# Patient Record
Sex: FEMALE | Race: WHITE | NOT HISPANIC OR LATINO | Employment: UNEMPLOYED | URBAN - METROPOLITAN AREA
[De-identification: names, ages, dates, MRNs, and addresses within clinical notes are randomized per-mention and may not be internally consistent; named-entity substitution may affect disease eponyms.]

---

## 2017-11-04 LAB — HCV AB SER-ACNC: NEGATIVE

## 2021-04-08 DIAGNOSIS — Z23 ENCOUNTER FOR IMMUNIZATION: ICD-10-CM

## 2022-06-09 LAB — HBA1C MFR BLD HPLC: 6.4 %

## 2023-06-26 ENCOUNTER — TELEPHONE (OUTPATIENT)
Dept: ADMINISTRATIVE | Facility: OTHER | Age: 68
End: 2023-06-26

## 2023-06-26 RX ORDER — LEVOTHYROXINE SODIUM 0.05 MG/1
TABLET ORAL
COMMUNITY
Start: 2023-05-03

## 2023-06-26 RX ORDER — SPIRONOLACTONE 25 MG/1
TABLET ORAL
COMMUNITY
Start: 2023-05-03

## 2023-06-26 RX ORDER — VALSARTAN 80 MG/1
TABLET ORAL
COMMUNITY
Start: 2023-05-03

## 2023-06-26 NOTE — TELEPHONE ENCOUNTER
----- Message from Cee Mcdaniel sent at 6/26/2023 11:19 AM EDT -----  Regarding: mammo  06/26/23 11:20 AM    Hello, our patient Rolly Britt has had Mammogram completed/performed  Please assist in updating the patient chart by pulling the document from chart review  Tab within Chart Review  The date of service is 11/2022       Thank you,  Cee Mcdaniel   Sawyer Malgorzata

## 2023-06-26 NOTE — TELEPHONE ENCOUNTER
Upon review of the In Basket request we were able to locate, review, and update the patient chart as requested for Mammogram     Any additional questions or concerns should be emailed to the Practice Liaisons via the appropriate education email address, please do not reply via In Basket      Thank you  Owen Aragon

## 2023-06-27 ENCOUNTER — OFFICE VISIT (OUTPATIENT)
Dept: FAMILY MEDICINE CLINIC | Facility: CLINIC | Age: 68
End: 2023-06-27
Payer: MEDICARE

## 2023-06-27 ENCOUNTER — APPOINTMENT (OUTPATIENT)
Age: 68
End: 2023-06-27
Payer: MEDICARE

## 2023-06-27 ENCOUNTER — TELEPHONE (OUTPATIENT)
Dept: ADMINISTRATIVE | Facility: OTHER | Age: 68
End: 2023-06-27

## 2023-06-27 VITALS
BODY MASS INDEX: 30.79 KG/M2 | TEMPERATURE: 97.6 F | HEIGHT: 63 IN | WEIGHT: 173.8 LBS | SYSTOLIC BLOOD PRESSURE: 122 MMHG | DIASTOLIC BLOOD PRESSURE: 72 MMHG

## 2023-06-27 DIAGNOSIS — R50.9 FEVER, UNSPECIFIED FEVER CAUSE: ICD-10-CM

## 2023-06-27 DIAGNOSIS — L30.9 DERMATITIS: Primary | ICD-10-CM

## 2023-06-27 DIAGNOSIS — L30.9 DERMATITIS: ICD-10-CM

## 2023-06-27 LAB — B BURGDOR IGG+IGM SER-ACNC: 1 AI

## 2023-06-27 PROCEDURE — 99213 OFFICE O/P EST LOW 20 MIN: CPT | Performed by: NURSE PRACTITIONER

## 2023-06-27 PROCEDURE — 86617 LYME DISEASE ANTIBODY: CPT

## 2023-06-27 PROCEDURE — 86618 LYME DISEASE ANTIBODY: CPT

## 2023-06-27 PROCEDURE — 36415 COLL VENOUS BLD VENIPUNCTURE: CPT

## 2023-06-27 RX ORDER — CHLORAL HYDRATE 500 MG
1000 CAPSULE ORAL DAILY
COMMUNITY

## 2023-06-27 RX ORDER — CEFUROXIME AXETIL 500 MG/1
500 TABLET ORAL EVERY 12 HOURS SCHEDULED
Qty: 20 TABLET | Refills: 0 | Status: SHIPPED | OUTPATIENT
Start: 2023-06-27 | End: 2023-07-07

## 2023-06-27 NOTE — TELEPHONE ENCOUNTER
----- Message from Natalie Negrete MA sent at 6/26/2023  2:28 PM EDT -----  Regarding: hep c  06/26/23 2:28 PM    Hello, our patient Vinay Eddie has had Hepatitis C completed/performed  Please assist in updating the patient chart by pulling the Care Everywhere (CE) document  The date of service is 11/4/17       Thank you,  Jacqualyn Nurse, MA [facility]

## 2023-06-27 NOTE — TELEPHONE ENCOUNTER
Upon review of the In Basket request we were able to locate, review, and update the patient chart as requested for CRC: Cologkrunal  Any additional questions or concerns should be emailed to the Practice Liaisons via the appropriate education email address, please do not reply via In Basket      Thank you  Candi Lopez

## 2023-06-27 NOTE — TELEPHONE ENCOUNTER
IT is working on correcting the [employer]  The requested item will be linked by the system at that time  Request is being closed  Thank you

## 2023-06-27 NOTE — PROGRESS NOTES
"Name: Maribell Carmona      : 1955      MRN: [id number]  Encounter Provider: DANNY Betancourt  Encounter Date: 2023   Encounter department: [facility]     1  Dermatitis  Comments:  Local reaction versus atypical erythema migrans versus mild cellulitis  Cover with Ceftin and days which should cover Lyme/cellulitis  Orders:  -     cefuroxime (CEFTIN) 500 mg tablet; Take 1 tablet (500 mg total) by mouth every 12 (twelve) hours for 10 days  -     Lyme Total Antibody Profile with reflex to WB; Future    2  Fever, unspecified fever cause  Comments:  Lyme titer though likely too soon to test positive  Follow-up if unresolved anytime if worse  Already takes probiotics  Subjective      Patient started 5 days ago with a fever  As high as 101  Started the day after she had been [place] cherries [place] an or[place]  And remember any insect bite and she did not see any ticks on her  In her right upper neck near her hairline so it may have been hidden  Now feels mildly itchy and it feels like there is pressure around her neck and posterior ear  Denies sinus congestion sore throat or cough  Mild fatigue, very mild aches nothing more than usual   Fever has improved  Review of Systems    Current Outpatient Medications on File Prior to Visit   Medication Sig   • levothyroxine 50 mcg tablet    • metFORMIN (GLUCOPHAGE) 500 mg tablet    • Omega-3 Fatty Acids (fish oil) 1,000 mg Take 1,000 mg by mouth daily   • spironolactone (ALDACTONE) 25 mg tablet    • valsartan (DIOVAN) 80 mg tablet        Objective     /72 (BP Location: Left arm, Patient Position: Sitting, Cuff Size: Adult)   Temp 97 6 °F (36 4 °C) (Tympanic)   Ht 5' 3\" (1 6 m)   Wt 78 8 kg (173 lb 12 8 oz)   BMI 30 79 kg/m²     Physical Exam  Vitals and nursing note reviewed  Constitutional:       General: She is not in acute distress  Appearance: Normal appearance   She is not " toxic-appearing  HENT:      Head: Normocephalic  Comments: Passage with perhaps a little bit of swelling on the right sinus but not red or inflamed no discharge throat is clear no cervical lymphadenopathy   The border of right lower hairline there is a round approximately 4 cm diameter red patch with not warm slightly tender slightly itchy  No central clearing  Left Ear: Tympanic membrane normal    Eyes:      Conjunctiva/sclera: Conjunctivae normal    Cardiovascular:      Rate and Rhythm: Normal rate and regular rhythm  Heart sounds: Normal heart sounds  Pulmonary:      Effort: Pulmonary effort is normal       Breath sounds: Normal breath sounds  Skin:     General: Skin is warm and dry  Neurological:      General: No focal deficit present  Mental Status: She is alert and oriented to person, place, and time     Psychiatric:         Mood and Affect: Mood normal        DANNY Betancourt

## 2023-06-27 NOTE — TELEPHONE ENCOUNTER
----- Message from Peggy Aguayo MA sent at 6/26/2023  2:26 PM EDT -----  Regarding: cologuard  06/26/23 2:26 PM    Hello, our patient Jane Shin has had CRC: Cologkrunal completed/performed  Please assist in updating the patient chart by pulling the Care Everywhere (CE) document  The date of service is 7/19/2020       Thank you,  Peggy Aguayo MA  [facility]

## 2023-06-29 ENCOUNTER — OFFICE VISIT (OUTPATIENT)
Dept: FAMILY MEDICINE CLINIC | Facility: CLINIC | Age: 68
End: 2023-06-29
Payer: MEDICARE

## 2023-06-29 VITALS
WEIGHT: 174.8 LBS | OXYGEN SATURATION: 97 % | HEIGHT: 63 IN | SYSTOLIC BLOOD PRESSURE: 122 MMHG | BODY MASS INDEX: 30.97 KG/M2 | HEART RATE: 76 BPM | DIASTOLIC BLOOD PRESSURE: 74 MMHG | TEMPERATURE: 97.5 F

## 2023-06-29 DIAGNOSIS — Z13.820 SCREENING FOR OSTEOPOROSIS: ICD-10-CM

## 2023-06-29 DIAGNOSIS — Z78.0 UNCOMPLICATED MENOPAUSE: ICD-10-CM

## 2023-06-29 DIAGNOSIS — G47.33 OBSTRUCTIVE SLEEP APNEA SYNDROME: Primary | ICD-10-CM

## 2023-06-29 DIAGNOSIS — E03.9 ACQUIRED HYPOTHYROIDISM: ICD-10-CM

## 2023-06-29 DIAGNOSIS — E78.2 MIXED HYPERLIPIDEMIA: ICD-10-CM

## 2023-06-29 DIAGNOSIS — L65.9 ALOPECIA: ICD-10-CM

## 2023-06-29 DIAGNOSIS — E55.9 VITAMIN D DEFICIENCY: ICD-10-CM

## 2023-06-29 DIAGNOSIS — R73.03 PREDIABETES: ICD-10-CM

## 2023-06-29 DIAGNOSIS — I10 ESSENTIAL HYPERTENSION: ICD-10-CM

## 2023-06-29 LAB
B BURGDOR IGG PATRN SER IB-IMP: NEGATIVE
B BURGDOR IGM PATRN SER IB-IMP: NEGATIVE
B BURGDOR18KD IGG SER QL IB: ABNORMAL
B BURGDOR23KD IGG SER QL IB: ABNORMAL
B BURGDOR23KD IGM SER QL IB: PRESENT
B BURGDOR28KD IGG SER QL IB: ABNORMAL
B BURGDOR30KD IGG SER QL IB: ABNORMAL
B BURGDOR39KD IGG SER QL IB: ABNORMAL
B BURGDOR39KD IGM SER QL IB: ABNORMAL
B BURGDOR41KD IGG SER QL IB: ABNORMAL
B BURGDOR41KD IGM SER QL IB: ABNORMAL
B BURGDOR45KD IGG SER QL IB: ABNORMAL
B BURGDOR58KD IGG SER QL IB: ABNORMAL
B BURGDOR66KD IGG SER QL IB: ABNORMAL
B BURGDOR93KD IGG SER QL IB: ABNORMAL

## 2023-06-29 PROCEDURE — 99213 OFFICE O/P EST LOW 20 MIN: CPT | Performed by: INTERNAL MEDICINE

## 2023-06-29 RX ORDER — BACILLUS COAGULANS/INULIN 1B-250 MG
CAPSULE ORAL
COMMUNITY
Start: 2021-03-30

## 2023-06-29 RX ORDER — AMPICILLIN TRIHYDRATE 250 MG
CAPSULE ORAL
COMMUNITY
Start: 2021-03-01

## 2023-06-29 NOTE — ASSESSMENT & PLAN NOTE
Patient is very compliant  She is not particularly happy with wearing a CPAP, but she notes she no longer has difficulty getting out of bed  She is more awake in the morning  She is on AutoPap  We will continue with compliance, has follow-up in 2 months again for a wellness visit

## 2023-06-29 NOTE — PROGRESS NOTES
Name: Dmitriy Emanuel      : 1955      MRN: [id number]  Encounter Provider: Joy Daniel DO  Encounter Date: 2023   Encounter department: [facility]     1  Obstructive sleep apnea syndrome  Assessment & Plan:  Patient is very compliant  She is not particularly happy with wearing a CPAP, but she notes she no longer has difficulty getting out of bed  She is more awake in the morning  She is on AutoPap  We will continue with compliance, has follow-up in 2 months again for a wellness visit  2  Essential hypertension  Assessment & Plan:  Since wearing CPAP and now on spironolactone, her blood pressure is in the lower realm systolically  She will monitor outpatient blood pressures and bring them to her next appointment  Consider de-escalation of anti-hypertensives  Orders:  -     CBC and differential; Future  -     Comprehensive metabolic panel; Future    3  Acquired hypothyroidism  Assessment & Plan:  Continue with periodic monitoring    Orders:  -     T4, free; Future; Expected date: 2023  -     TSH, 3rd generation; Future  -     Lipid Panel with Direct LDL reflex; Future    4  Prediabetes  Assessment & Plan:  Diet and exercise discussed  Continue low carbohydrate intake  Orders:  -     CBC and differential; Future  -     Comprehensive metabolic panel; Future  -     Hemoglobin A1C; Future    5  Alopecia  Assessment & Plan:  Remains on spironolactone      6  Screening for osteoporosis  Assessment & Plan:  Due for bone density    Orders:  -     DXA bone density spine hip and pelvis; Future; Expected date: 2023    7  Mixed hyperlipidemia  Assessment & Plan:  Labs before next appointment      8  Vitamin D deficiency  Assessment & Plan:  Due for bone density and vitamin D screening  Orders:  -     Vitamin D 25 hydroxy; Future    9  Uncomplicated menopause  -     DXA bone density spine hip and pelvis;  Future; Expected date: "07/29/2023           Subjective      Patient feels well  She is here mostly for follow-up of her sleep apnea under use of CPAP  She has been very compliant  Using it more than 68 hours a night  Notably feeling more refreshed when she wakes up  Somewhat brighter in the morning  Has some more energy as well  Denies chest pain  No shortness of breath  Her  also has struct of sleep apnea so they are wearing their units together  Review of Systems   Constitutional: Negative for chills and fever  HENT: Negative for rhinorrhea and sore throat  Eyes: Negative for visual disturbance  Respiratory: Negative for cough and shortness of breath  Cardiovascular: Negative for chest pain and leg swelling  Gastrointestinal: Negative for abdominal pain, diarrhea, nausea and vomiting  Genitourinary: Negative for dysuria  Musculoskeletal: Negative for back pain and myalgias  Skin: Negative for rash  Neurological: Negative for dizziness and headaches  Psychiatric/Behavioral: Negative for confusion  All other systems reviewed and are negative  Current Outpatient Medications on File Prior to Visit   Medication Sig   • Bacillus Coagulans-Inulin (Probiotic) 1-250 BILLION-MG CAPS    • cefuroxime (CEFTIN) 500 mg tablet Take 1 tablet (500 mg total) by mouth every 12 (twelve) hours for 10 days   • Coenzyme Q10 (CoQ10) 200 MG CAPS    • levothyroxine 50 mcg tablet    • metFORMIN (GLUCOPHAGE) 500 mg tablet    • Omega-3 Fatty Acids (fish oil) 1,000 mg Take 1,000 mg by mouth daily   • spironolactone (ALDACTONE) 25 mg tablet    • valsartan (DIOVAN) 80 mg tablet        Objective     /74 (BP Location: Left arm, Patient Position: Sitting, Cuff Size: Adult)   Pulse 76   Temp 97 5 °F (36 4 °C) (Tympanic)   Ht 5' 3\" (1 6 m)   Wt 79 3 kg (174 lb 12 8 oz)   SpO2 97%   BMI 30 96 kg/m²     Physical Exam  Constitutional:       Appearance: Normal appearance     HENT:      Head: Normocephalic and " atraumatic  Nose: No congestion or rhinorrhea  Eyes:      Extraocular Movements: Extraocular movements intact  Pupils: Pupils are equal, round, and reactive to light  Neck:      Vascular: No carotid bruit  Cardiovascular:      Rate and Rhythm: Normal rate and regular rhythm  Heart sounds: No murmur heard  Pulmonary:      Effort: No respiratory distress  Breath sounds: No wheezing  Chest:      Chest wall: No tenderness  Abdominal:      General: There is no distension  Tenderness: There is no abdominal tenderness  Hernia: No hernia is present  Musculoskeletal:         General: No swelling or tenderness  Right lower leg: No edema  Left lower leg: No edema  Lymphadenopathy:      Cervical: No cervical adenopathy  Skin:     Findings: No rash  Neurological:      General: No focal deficit present  Mental Status: She is alert and oriented to person, place, and time  Sensory: No sensory deficit     Psychiatric:         Mood and Affect: Mood normal          Behavior: Behavior normal        Blane Napier DO

## 2023-06-29 NOTE — ASSESSMENT & PLAN NOTE
Since wearing CPAP and now on spironolactone, her blood pressure is in the lower realm systolically  She will monitor outpatient blood pressures and bring them to her next appointment  Consider de-escalation of anti-hypertensives

## 2023-07-31 ENCOUNTER — HOSPITAL ENCOUNTER (OUTPATIENT)
Dept: BONE DENSITY | Facility: HOSPITAL | Age: 68
Discharge: HOME | End: 2023-07-31
Attending: INTERNAL MEDICINE
Payer: MEDICARE

## 2023-07-31 VITALS — WEIGHT: 170.86 LBS | HEIGHT: 64 IN | BODY MASS INDEX: 29.17 KG/M2

## 2023-07-31 DIAGNOSIS — Z13.820 SCREENING FOR OSTEOPOROSIS: ICD-10-CM

## 2023-07-31 DIAGNOSIS — Z78.0 UNCOMPLICATED MENOPAUSE: ICD-10-CM

## 2023-07-31 PROCEDURE — 77080 DXA BONE DENSITY AXIAL: CPT

## 2023-08-21 ENCOUNTER — RA CDI HCC (OUTPATIENT)
Dept: OTHER | Facility: HOSPITAL | Age: 68
End: 2023-08-21

## 2023-08-24 LAB — HBA1C MFR BLD HPLC: 6.6 %

## 2023-08-25 ENCOUNTER — TELEPHONE (OUTPATIENT)
Dept: ADMINISTRATIVE | Facility: OTHER | Age: 68
End: 2023-08-25

## 2023-08-25 NOTE — TELEPHONE ENCOUNTER
----- Message from Rex Arora sent at 8/25/2023 11:13 AM EDT -----  Regarding: dhruv  08/25/23 11:13 AM    Hello, our patient Teodora Ny has had CRC: Dhruv completed/performed. Please assist in updating the patient chart by pulling the Care Everywhere (CE) document. The date of service is 07/19/2020.      Thank you,  Rex Arora   [address]

## 2023-08-28 PROBLEM — Z13.820 SCREENING FOR OSTEOPOROSIS: Status: RESOLVED | Noted: 2023-06-29 | Resolved: 2023-08-28

## 2023-08-29 NOTE — TELEPHONE ENCOUNTER
Upon review of the In Basket request we have noted IT is working on correcting the [employer]. The requested item will be linked by the system at that time. Request is being closed. Thank you. Any additional questions or concerns should be emailed to the Practice Liaisons via the appropriate education email address, please do not reply via In Basket.     Thank you  Radha Luis

## 2023-08-30 ENCOUNTER — OFFICE VISIT (OUTPATIENT)
Dept: FAMILY MEDICINE CLINIC | Facility: CLINIC | Age: 68
End: 2023-08-30
Payer: MEDICARE

## 2023-08-30 VITALS
BODY MASS INDEX: 30.48 KG/M2 | TEMPERATURE: 97.2 F | WEIGHT: 172 LBS | SYSTOLIC BLOOD PRESSURE: 138 MMHG | DIASTOLIC BLOOD PRESSURE: 82 MMHG | HEIGHT: 63 IN

## 2023-08-30 DIAGNOSIS — L65.9 ALOPECIA: ICD-10-CM

## 2023-08-30 DIAGNOSIS — R73.03 PREDIABETES: ICD-10-CM

## 2023-08-30 DIAGNOSIS — E03.9 ACQUIRED HYPOTHYROIDISM: ICD-10-CM

## 2023-08-30 DIAGNOSIS — Z23 ENCOUNTER FOR IMMUNIZATION: Primary | ICD-10-CM

## 2023-08-30 DIAGNOSIS — E78.2 MIXED HYPERLIPIDEMIA: ICD-10-CM

## 2023-08-30 DIAGNOSIS — I10 HYPERTENSION, UNSPECIFIED TYPE: ICD-10-CM

## 2023-08-30 DIAGNOSIS — G47.33 OBSTRUCTIVE SLEEP APNEA SYNDROME: ICD-10-CM

## 2023-08-30 DIAGNOSIS — I10 ESSENTIAL HYPERTENSION: ICD-10-CM

## 2023-08-30 DIAGNOSIS — Z00.00 MEDICARE ANNUAL WELLNESS VISIT, SUBSEQUENT: ICD-10-CM

## 2023-08-30 PROCEDURE — 90471 IMMUNIZATION ADMIN: CPT

## 2023-08-30 PROCEDURE — 90677 PCV20 VACCINE IM: CPT

## 2023-08-30 PROCEDURE — G0439 PPPS, SUBSEQ VISIT: HCPCS | Performed by: INTERNAL MEDICINE

## 2023-08-30 RX ORDER — AMLODIPINE BESYLATE 2.5 MG/1
2.5 TABLET ORAL DAILY
Qty: 90 TABLET | Refills: 3 | Status: SHIPPED | OUTPATIENT
Start: 2023-08-30

## 2023-08-30 NOTE — ASSESSMENT & PLAN NOTE
Continue with healthy lifestyle, diet, exercise, and continue metformin for now. Monitor A1c every 6 months.

## 2023-08-30 NOTE — ASSESSMENT & PLAN NOTE
We will change her valsartan to amlodipine 2.5 mg. This on the basis of her potassium being high normal at 5.2. She remains on spironolactone, so ACE and ARB are relative contraindications. If that she does not tolerate this, then might consider discontinuation of spironolactone and resumption of the ARB.

## 2023-08-30 NOTE — PROGRESS NOTES
Assessment and Plan:     Problem List Items Addressed This Visit        Endocrine    Acquired hypothyroidism     Continue levothyroxine and periodic monitoring. Relevant Orders    TSH, 3rd generation    T4, free       Respiratory    Obstructive sleep apnea syndrome     Patient is now on CPAP, and cannot believe her energy at this point. Uses it nightly and is quite compliant. Cardiovascular and Mediastinum    Essential hypertension     We will change her valsartan to amlodipine 2.5 mg. This on the basis of her potassium being high normal at 5.2. She remains on spironolactone, so ACE and ARB are relative contraindications. If that she does not tolerate this, then might consider discontinuation of spironolactone and resumption of the ARB. Relevant Medications    amLODIPine (NORVASC) 2.5 mg tablet       Musculoskeletal and Integument    Alopecia     Possibly related to the elevated testosterone levels, although there is family history of the same. Status post hysterectomy and bilateral salpingo-oophorectomy, remains on spironolactone. Surgery was 6 months ago, might consider discontinuation of the spironolactone given potassium issues. We will alter her blood pressure regimen for now. Other    Prediabetes     Continue with healthy lifestyle, diet, exercise, and continue metformin for now. Monitor A1c every 6 months. Relevant Orders    Hemoglobin A1C    Lipid Panel with Direct LDL reflex    Albumin / creatinine urine ratio    Mixed hyperlipidemia     Continue to monitor lipids every 6 months as well as her A1c.          Relevant Orders    Lipid Panel with Direct LDL reflex   Other Visit Diagnoses     Encounter for immunization    -  Primary    Relevant Orders    Pneumococcal Conjugate Vaccine 20-valent (PCV20) (Completed)    Hypertension, unspecified type        Relevant Medications    amLODIPine (NORVASC) 2.5 mg tablet    Other Relevant Orders    Comprehensive metabolic panel    CBC and differential    Medicare annual wellness visit, subsequent               Preventive health issues were discussed with patient, and age appropriate screening tests were ordered as noted in patient's After Visit Summary. Personalized health advice and appropriate referrals for health education or preventive services given if needed, as noted in patient's After Visit Summary. History of Present Illness:     Patient presents for a Medicare Wellness Visit    Sarah Ba feels well. Offers no complaints. Denies chest pain or shortness of breath. No nausea or vomiting. Blood pressures been reasonably controlled. Reviewed her labs today, revealing A1c to be reasonably controlled in the prediabetic range. She remains on spironolactone, status post hysterectomy and bilateral salpingo-oophorectomy due to elevated testosterone levels. She also remains on an ARB for blood pressure. Potassium is noted to be high normal at 5.2. She denies any chest pain or shortness of breath. Nausea vomiting or diarrhea. Patient Care Team:  Catrachita Lehman DO as PCP - General (Family Medicine)     Review of Systems:     Review of Systems   Constitutional: Negative for chills and fever. HENT: Negative for rhinorrhea and sore throat. Eyes: Negative for visual disturbance. Respiratory: Negative for cough and shortness of breath. Cardiovascular: Negative for chest pain and leg swelling. Gastrointestinal: Negative for abdominal pain, diarrhea, nausea and vomiting. Genitourinary: Negative for dysuria. Musculoskeletal: Positive for arthralgias and back pain. Negative for myalgias. Skin: Negative for rash. Hair loss   Neurological: Negative for dizziness and headaches. Psychiatric/Behavioral: Negative for confusion. All other systems reviewed and are negative.        Problem List:     Patient Active Problem List   Diagnosis   • Acquired hypothyroidism   • Essential hypertension   • Prediabetes   • Obstructive sleep apnea syndrome   • Uncomplicated menopause   • Vitamin D deficiency   • Mixed hyperlipidemia   • Alopecia      Past Medical and Surgical History:     Past Medical History:   Diagnosis Date   • Headache(784.0)    • Hypercholesterolemia    • Hypertension    • Hypothyroid    • Nonscarring hair loss    • Prediabetes 10/29/2021   • Uterine polyp 1998     Past Surgical History:   Procedure Laterality Date   • BREAST BIOPSY  1997   • CHOLECYSTECTOMY  2010   • HYSTERECTOMY W/ SALPINGO-OOPHERECTOMY  02/06/2023    Hysterectomy and bilateral salpingo-oophorectomy      Family History:     Family History   Problem Relation Age of Onset   • Hypertension Mother    • Cancer Father         Prostate   • Hypertension Father    • Hyperlipidemia Father    • Stroke Father    • Cancer Brother         Prostate   • Cancer Sister       Social History:     Social History     Socioeconomic History   • Marital status: /Civil Union     Spouse name: None   • Number of children: None   • Years of education: None   • Highest education level: None   Occupational History   • None   Tobacco Use   • Smoking status: Never     Passive exposure: Never   • Smokeless tobacco: Never   Vaping Use   • Vaping Use: Never used   Substance and Sexual Activity   • Alcohol use: Yes     Alcohol/week: 1.0 standard drink of alcohol     Types: 1 Glasses of wine per week     Comment: History of alcohol use   • Drug use: Never   • Sexual activity: Yes     Partners: Male     Birth control/protection: None   Other Topics Concern   • None   Social History Narrative   • None     Social Determinants of Health     Financial Resource Strain: Low Risk  (8/24/2023)    Overall Financial Resource Strain (CARDIA)    • Difficulty of Paying Living Expenses: Not hard at all   Food Insecurity: Not on file   Transportation Needs: No Transportation Needs (8/24/2023)    PRAPARE - Transportation    • Lack of Transportation (Medical):  No    • Lack of Transportation (Non-Medical): No   Physical Activity: Not on file   Stress: Not on file   Social Connections: Not on file   Intimate Partner Violence: Not on file   Housing Stability: Not on file      Medications and Allergies:     Current Outpatient Medications   Medication Sig Dispense Refill   • amLODIPine (NORVASC) 2.5 mg tablet Take 1 tablet (2.5 mg total) by mouth daily 90 tablet 3   • Bacillus Coagulans-Inulin (Probiotic) 1-250 BILLION-MG CAPS      • Coenzyme Q10 (CoQ10) 200 MG CAPS      • levothyroxine 50 mcg tablet      • metFORMIN (GLUCOPHAGE) 500 mg tablet      • Omega-3 Fatty Acids (fish oil) 1,000 mg Take 1,000 mg by mouth daily     • spironolactone (ALDACTONE) 25 mg tablet        No current facility-administered medications for this visit. No Known Allergies   Immunizations:     Immunization History   Administered Date(s) Administered   • COVID-19 MODERNA VACC 0.5 ML IM 10/05/2022   • COVID-19 PFIZER VACCINE 0.3 ML IM 03/05/2021, 03/26/2021, 09/30/2021, 06/01/2022, 06/30/2022   • Hepatitis A 03/30/2022, 06/30/2022   • INFLUENZA 08/17/2014, 10/18/2021, 09/07/2022   • Influenza Quadrivalent Preservative Free 3 years and older IM 09/30/2015   • Pneumococcal Conjugate 13-Valent 12/13/2021   • Pneumococcal Conjugate Vaccine 20-valent (Pcv20), Polysace 08/30/2023   • Tdap 12/29/2021   • Zoster 12/09/2016   • Zoster Vaccine Recombinant 03/12/2019, 06/11/2019      Health Maintenance:         Topic Date Due   • Colorectal Cancer Screening  Never done   • Breast Cancer Screening: Mammogram  11/30/2024   • Hepatitis C Screening  Completed         Topic Date Due   • COVID-19 Vaccine (7 - Pfizer series) 02/05/2023   • Influenza Vaccine (1) 09/01/2023      Medicare Screening Tests and Risk Assessments:     Last Medicare Wellness visit information reviewed, patient interviewed and updates made to the record today. Health Risk Assessment:   Patient rates overall health as very good.  Patient feels that their physical health rating is same. Patient is satisfied with their life. Eyesight was rated as same. Hearing was rated as same. Patient feels that their emotional and mental health rating is same. Patients states they are sometimes angry. Patient states they are never, rarely unusually tired/fatigued. Pain experienced in the last 7 days has been some. Patient's pain rating has been 3/10. Patient states that she has experienced no weight loss or gain in last 6 months. Depression Screening:   PHQ-2 Score: 0      Fall Risk Screening: In the past year, patient has experienced: no history of falling in past year      Urinary Incontinence Screening:   Patient has leaked urine accidently in the last six months. Home Safety:  Patient does not have trouble with stairs inside or outside of their home. Patient has working smoke alarms and has working carbon monoxide detector. Home safety hazards include: none. Nutrition:   Current diet is Regular. Medications:   Patient is currently taking over-the-counter supplements. OTC medications include: Vitamin  D, CoQ10, Fish Oil, Biotin, Probiotic, Vitamin C. Patient is able to manage medications. Activities of Daily Living (ADLs)/Instrumental Activities of Daily Living (IADLs):   Walk and transfer into and out of bed and chair?: Yes  Dress and groom yourself?: Yes    Bathe or shower yourself?: Yes    Feed yourself? Yes  Do your laundry/housekeeping?: Yes  Manage your money, pay your bills and track your expenses?: Yes  Make your own meals?: Yes    Do your own shopping?: Yes    Previous Hospitalizations:   Any hospitalizations or ED visits within the last 12 months?: Yes    How many hospitalizations have you had in the last year?: 1-2    Advance Care Planning:   Living will: Yes    Durable POA for healthcare:  Yes    Advanced directive: Yes    Advanced directive counseling given: Yes    Five wishes given: Yes    Patient declined ACP directive: No    End of Life Decisions reviewed with patient: Yes    Provider agrees with end of life decisions: Yes      Cognitive Screening:   Provider or family/friend/caregiver concerned regarding cognition?: Yes    PREVENTIVE SCREENINGS      Cardiovascular Screening:    General: Screening Not Indicated, History Lipid Disorder and Screening Current      Diabetes Screening:     General: Screening Current      Colorectal Cancer Screening:     General: Screening Current      Breast Cancer Screening:     General: Screening Current      Cervical Cancer Screening:    General: Screening Not Indicated      Osteoporosis Screening:    General: Screening Current      Abdominal Aortic Aneurysm (AAA) Screening:        General: Screening Not Indicated      Lung Cancer Screening:     General: Screening Not Indicated      Hepatitis C Screening:    General: Screening Current    Screening, Brief Intervention, and Referral to Treatment (SBIRT)    Screening  Typical number of drinks in a day: 0  Typical number of drinks in a week: 2  Interpretation: Low risk drinking behavior. AUDIT-C Screenin) How often did you have a drink containing alcohol in the past year? 2 to 4 times a month  2) How many drinks did you have on a typical day when you were drinking in the past year? 0  3) How often did you have 6 or more drinks on one occasion in the past year? never    AUDIT-C Score: 2  Interpretation: Score 0-2 (female): Negative screen for alcohol misuse    Single Item Drug Screening:  How often have you used an illegal drug (including marijuana) or a prescription medication for non-medical reasons in the past year? never    Single Item Drug Screen Score: 0  Interpretation: Negative screen for possible drug use disorder    Other Counseling Topics:   Calcium and vitamin D intake and regular weightbearing exercise. No results found.      Physical Exam:     /82 (BP Location: Left arm, Patient Position: Sitting, Cuff Size: Large)   Temp (!) 97.2 °F (36.2 °C)   Ht 5' 3" (1.6 m)   Wt 78 kg (172 lb)   BMI 30.47 kg/m²     Physical Exam  Vitals and nursing note reviewed. Constitutional:       Appearance: Normal appearance. She is well-developed. HENT:      Head: Normocephalic and atraumatic. Nose: Nose normal.      Mouth/Throat:      Pharynx: No oropharyngeal exudate. Eyes:      General: No scleral icterus. Conjunctiva/sclera: Conjunctivae normal.      Pupils: Pupils are equal, round, and reactive to light. Neck:      Vascular: No JVD. Trachea: No tracheal deviation. Cardiovascular:      Rate and Rhythm: Normal rate and regular rhythm. Heart sounds: Normal heart sounds. No murmur heard. Pulmonary:      Effort: Pulmonary effort is normal. No respiratory distress. Breath sounds: Normal breath sounds. No wheezing or rales. Abdominal:      General: Bowel sounds are normal.      Palpations: Abdomen is soft. Tenderness: There is no abdominal tenderness. There is no guarding. Musculoskeletal:         General: No tenderness. Normal range of motion. Cervical back: Normal range of motion and neck supple. Skin:     General: Skin is warm and dry. Neurological:      General: No focal deficit present. Mental Status: She is alert and oriented to person, place, and time. Cranial Nerves: No cranial nerve deficit. Sensory: No sensory deficit. Motor: No abnormal muscle tone.       Comments: 5/5 motor, nl sens   Psychiatric:         Mood and Affect: Mood normal.         Behavior: Behavior normal.          Douglas Stevenson DO

## 2023-08-30 NOTE — ASSESSMENT & PLAN NOTE
Possibly related to the elevated testosterone levels, although there is family history of the same. Status post hysterectomy and bilateral salpingo-oophorectomy, remains on spironolactone. Surgery was 6 months ago, might consider discontinuation of the spironolactone given potassium issues. We will alter her blood pressure regimen for now.

## 2023-08-30 NOTE — ASSESSMENT & PLAN NOTE
Patient is now on CPAP, and cannot believe her energy at this point. Uses it nightly and is quite compliant.

## 2023-09-22 ENCOUNTER — VBI (OUTPATIENT)
Dept: ADMINISTRATIVE | Facility: OTHER | Age: 68
End: 2023-09-22

## 2023-09-22 DIAGNOSIS — I10 ESSENTIAL HYPERTENSION: Primary | ICD-10-CM

## 2023-09-22 DIAGNOSIS — E03.9 ACQUIRED HYPOTHYROIDISM: ICD-10-CM

## 2023-09-22 RX ORDER — VALSARTAN 80 MG/1
80 TABLET ORAL DAILY
COMMUNITY
End: 2023-09-22 | Stop reason: SDUPTHER

## 2023-09-22 NOTE — TELEPHONE ENCOUNTER
09/22/23 9:15 AM    The patient was called and has declined completing a Cologuard test, please discuss with the patient at next visit. Thank you.   Kayy Frias MA  [facility]

## 2023-09-23 RX ORDER — SPIRONOLACTONE 25 MG/1
25 TABLET ORAL DAILY
Qty: 90 TABLET | Refills: 3 | Status: SHIPPED | OUTPATIENT
Start: 2023-09-23

## 2023-09-23 RX ORDER — LEVOTHYROXINE SODIUM 0.05 MG/1
50 TABLET ORAL DAILY
Qty: 90 TABLET | Refills: 3 | Status: SHIPPED | OUTPATIENT
Start: 2023-09-23

## 2023-09-23 RX ORDER — VALSARTAN 80 MG/1
80 TABLET ORAL DAILY
Qty: 90 TABLET | Refills: 3 | Status: SHIPPED | OUTPATIENT
Start: 2023-09-23

## 2023-09-25 ENCOUNTER — TELEPHONE (OUTPATIENT)
Dept: FAMILY MEDICINE CLINIC | Facility: CLINIC | Age: 68
End: 2023-09-25

## 2023-09-25 NOTE — TELEPHONE ENCOUNTER
Told patient to take 2 amlodipine per day. Monitor for lower extremity edema. Continue to monitor blood pressure.

## 2023-09-25 NOTE — TELEPHONE ENCOUNTER
Aria Geronimo called to say that Dr Yara Starr switched her from valsartan to amlodipine. She said her BP has been elevated. She said it has been running about 150/90 but today it was 171/114. She said she still has valsartan if he wants her to go back on it.  She can be reached at [phone]

## 2023-09-27 ENCOUNTER — TELEPHONE (OUTPATIENT)
Dept: FAMILY MEDICINE CLINIC | Facility: CLINIC | Age: 68
End: 2023-09-27

## 2023-09-27 NOTE — TELEPHONE ENCOUNTER
Patient left message on nurse line stating she spoke to someone on Monday and her Blood pressure medication was changed to spirolactone and amlodipine. States her bp is still running 152/100 and 155/98 and she is feeling "pretty rotten". Wants to know if you want her to go back on her valsartan or what she should do. Patient can be reached back at [phone].

## 2023-09-28 ENCOUNTER — TELEPHONE (OUTPATIENT)
Dept: FAMILY MEDICINE CLINIC | Facility: CLINIC | Age: 68
End: 2023-09-28

## 2023-09-28 DIAGNOSIS — I10 ESSENTIAL HYPERTENSION: Primary | ICD-10-CM

## 2023-10-04 ENCOUNTER — IMMUNIZATIONS (OUTPATIENT)
Dept: FAMILY MEDICINE CLINIC | Facility: CLINIC | Age: 68
End: 2023-10-04
Payer: MEDICARE

## 2023-10-04 DIAGNOSIS — Z23 ENCOUNTER FOR IMMUNIZATION: Primary | ICD-10-CM

## 2023-10-04 PROCEDURE — 90662 IIV NO PRSV INCREASED AG IM: CPT

## 2023-10-04 PROCEDURE — G0008 ADMIN INFLUENZA VIRUS VAC: HCPCS

## 2023-12-15 DIAGNOSIS — R73.03 PREDIABETES: Primary | ICD-10-CM

## 2024-02-29 ENCOUNTER — RA CDI HCC (OUTPATIENT)
Dept: OTHER | Facility: HOSPITAL | Age: 69
End: 2024-02-29

## 2024-03-07 ENCOUNTER — OFFICE VISIT (OUTPATIENT)
Dept: FAMILY MEDICINE CLINIC | Facility: CLINIC | Age: 69
End: 2024-03-07
Payer: MEDICARE

## 2024-03-07 VITALS
TEMPERATURE: 96.5 F | SYSTOLIC BLOOD PRESSURE: 136 MMHG | HEART RATE: 82 BPM | OXYGEN SATURATION: 95 % | DIASTOLIC BLOOD PRESSURE: 80 MMHG | HEIGHT: 63 IN | WEIGHT: 174 LBS | BODY MASS INDEX: 30.83 KG/M2

## 2024-03-07 DIAGNOSIS — E83.52 HYPERCALCEMIA: ICD-10-CM

## 2024-03-07 DIAGNOSIS — Z12.11 SCREENING FOR COLON CANCER: ICD-10-CM

## 2024-03-07 DIAGNOSIS — E03.9 ACQUIRED HYPOTHYROIDISM: ICD-10-CM

## 2024-03-07 DIAGNOSIS — I10 ESSENTIAL HYPERTENSION: Primary | ICD-10-CM

## 2024-03-07 DIAGNOSIS — R73.03 PREDIABETES: ICD-10-CM

## 2024-03-07 DIAGNOSIS — G47.33 OBSTRUCTIVE SLEEP APNEA SYNDROME: ICD-10-CM

## 2024-03-07 DIAGNOSIS — Z82.49 FAMILY HISTORY OF HYPERTROPHIC CARDIOMYOPATHY: ICD-10-CM

## 2024-03-07 PROCEDURE — 99213 OFFICE O/P EST LOW 20 MIN: CPT | Performed by: INTERNAL MEDICINE

## 2024-03-07 PROCEDURE — G2211 COMPLEX E/M VISIT ADD ON: HCPCS | Performed by: INTERNAL MEDICINE

## 2024-03-07 RX ORDER — ROSUVASTATIN CALCIUM 5 MG/1
TABLET, COATED ORAL
COMMUNITY
Start: 2023-01-26

## 2024-03-07 RX ORDER — B-COMPLEX WITH VITAMIN C
TABLET ORAL
COMMUNITY

## 2024-03-07 NOTE — PROGRESS NOTES
Name: Vicky L Kocher      : 1955      MRN: [id number]  Encounter Provider: Chuy Arora DO  Encounter Date: 3/7/2024   Encounter department: [facility]    Assessment & Plan     1. Essential hypertension  Assessment & Plan:  Continue on current medical therapy.  Monitor potassium.  She is on a low potassium diet at this point.    Orders:  -     CBC and differential; Future; Expected date: 2024  -     Comprehensive metabolic panel; Future; Expected date: 2024    2. Prediabetes  Assessment & Plan:  Continue to monitor the hemoglobin A1c.  Continues on metformin.      3. Obstructive sleep apnea syndrome  Assessment & Plan:  Continues to be compliant with her CPAP.      4. Acquired hypothyroidism  Assessment & Plan:  Will continue with periodic monitoring and replacement.    Orders:  -     T4, free; Future; Expected date: 2024  -     TSH, 3rd generation; Future    5. Family history of hypertrophic cardiomyopathy  Assessment & Plan:  She will consider 2D echo on next appointment.  No clinical symptoms and no evidence of obstruction on physical exam      6. Screening for colon cancer  -     Cologuard    7. Hypercalcemia  -     PTH, intact; Future; Expected date: 2024           Subjective      Acute feels well.  No fever no chills no sweats.  Denies any chest pain.  Recently found out her sister has hypertrophic cardiomyopathy.  She is due to go on a trip to [place].  No recent medication changes.  She taking valsartan as well as spironolactone, recent potassium was 5.3.  Blood pressure is much better controlled.  This was poorly controlled on amlodipine.  She has no shortness of breath.  Up-to-date on colon's.  Up-to-date on vaccines.  She is due for [employer].      Review of Systems   Constitutional:  Negative for chills and fever.   HENT:  Negative for rhinorrhea and sore throat.    Eyes:  Negative for visual disturbance.   Respiratory:  Negative for cough  "and shortness of breath.    Cardiovascular:  Negative for chest pain and leg swelling.   Gastrointestinal:  Negative for abdominal pain, diarrhea, nausea and vomiting.   Genitourinary:  Negative for dysuria.   Musculoskeletal:  Positive for arthralgias and back pain. Negative for myalgias.   Skin:  Negative for rash.        Hair loss is improved on spironolactone   Neurological:  Negative for dizziness and headaches.   Psychiatric/Behavioral:  Negative for confusion.    All other systems reviewed and are negative.      Current Outpatient Medications on File Prior to Visit   Medication Sig    B Complex-C (Vitamin B + C Complex) TABS Take by mouth    Bacillus Coagulans-Inulin (Probiotic) 1-250 BILLION-MG CAPS     Biotin 23059 MCG TBDP Take 10,000 mcg by mouth daily    Cholecalciferol (Vitamin D3) 1.25 MG (06959 UT) TABS     Coenzyme Q10 (CoQ10) 200 MG CAPS     levothyroxine 50 mcg tablet Take 1 tablet (50 mcg total) by mouth daily    metFORMIN (GLUCOPHAGE) 500 mg tablet Take 1 tablet (500 mg total) by mouth daily with breakfast    Omega-3 Fatty Acids (fish oil) 1,000 mg Take 1,000 mg by mouth daily    rosuvastatin (CRESTOR) 5 mg tablet     spironolactone (ALDACTONE) 25 mg tablet Take 1 tablet (25 mg total) by mouth daily    valsartan (DIOVAN) 80 mg tablet Take 1 tablet (80 mg total) by mouth daily       Objective     /80 (BP Location: Left arm, Patient Position: Sitting, Cuff Size: Large)   Pulse 82   Temp (!) 96.5 °F (35.8 °C) (Tympanic)   Ht 5' 3\" (1.6 m)   Wt 78.9 kg (174 lb)   SpO2 95%   BMI 30.82 kg/m²     Physical Exam  Constitutional:       Appearance: Normal appearance.   HENT:      Head: Normocephalic and atraumatic.      Nose: No congestion or rhinorrhea.   Eyes:      Extraocular Movements: Extraocular movements intact.      Pupils: Pupils are equal, round, and reactive to light.   Neck:      Vascular: No carotid bruit.   Cardiovascular:      Rate and Rhythm: Normal rate and regular rhythm.      " Heart sounds: No murmur heard.     Comments: No audible murmur even on Valsalva.  Pulmonary:      Effort: No respiratory distress.      Breath sounds: No wheezing.   Chest:      Chest wall: No tenderness.   Abdominal:      General: There is no distension.      Tenderness: There is no abdominal tenderness.      Hernia: No hernia is present.   Musculoskeletal:         General: No swelling or tenderness.      Right lower leg: No edema.      Left lower leg: No edema.   Lymphadenopathy:      Cervical: No cervical adenopathy.   Skin:     Findings: No rash.   Neurological:      General: No focal deficit present.      Mental Status: She is alert and oriented to person, place, and time.      Sensory: No sensory deficit.   Psychiatric:         Mood and Affect: Mood normal.         Behavior: Behavior normal.       Chuy Arora, DO

## 2024-03-07 NOTE — ASSESSMENT & PLAN NOTE
Continue on current medical therapy.  Monitor potassium.  She is on a low potassium diet at this point.

## 2024-04-01 LAB — COLOGUARD RESULT REPORTABLE: NEGATIVE

## 2024-05-09 ENCOUNTER — OFFICE VISIT (OUTPATIENT)
Dept: FAMILY MEDICINE CLINIC | Facility: CLINIC | Age: 69
End: 2024-05-09
Payer: MEDICARE

## 2024-05-09 VITALS
HEART RATE: 80 BPM | WEIGHT: 175 LBS | SYSTOLIC BLOOD PRESSURE: 128 MMHG | OXYGEN SATURATION: 96 % | HEIGHT: 63 IN | BODY MASS INDEX: 31.01 KG/M2 | DIASTOLIC BLOOD PRESSURE: 82 MMHG

## 2024-05-09 DIAGNOSIS — I10 ESSENTIAL HYPERTENSION: Primary | ICD-10-CM

## 2024-05-09 PROCEDURE — G2211 COMPLEX E/M VISIT ADD ON: HCPCS | Performed by: FAMILY MEDICINE

## 2024-05-09 PROCEDURE — 99213 OFFICE O/P EST LOW 20 MIN: CPT | Performed by: FAMILY MEDICINE

## 2024-05-09 RX ORDER — LISINOPRIL AND HYDROCHLOROTHIAZIDE 20; 12.5 MG/1; MG/1
1 TABLET ORAL DAILY
COMMUNITY

## 2024-05-09 NOTE — ASSESSMENT & PLAN NOTE
Reviewed BP logs brought in by patient today, averaging roughly 150/90.  She would like to switch from valsartan back to lisinopril/HCTZ, prior dose of 20/12.5 mg/day.  She will DC valsartan.  Continue Aldactone at current dose.  Check blood pressures at home, recheck appointment here 1 to 2 months, advised BMP at that follow-up visit to monitor renal function and potassium again.

## 2024-05-09 NOTE — PROGRESS NOTES
Name: Vicky L Kocher      : 1955      MRN: [id number]  Encounter Provider: Evelyn Machado DO  Encounter Date: 2024   Encounter department: [facility]    Assessment & Plan     1. Essential hypertension  Assessment & Plan:  Reviewed BP logs brought in by patient today, averaging roughly 150/90.  She would like to switch from valsartan back to lisinopril/HCTZ, prior dose of 20/12.5 mg/day.  She will DC valsartan.  Continue Aldactone at current dose.  Check blood pressures at home, recheck appointment here 1 to 2 months, advised BMP at that follow-up visit to monitor renal function and potassium again.             Subjective      Elevated BP-- BP reads at home elevated for the past few wks. No new stressors. Started having neck ache, back pain and headaches a few weeks ago, started monitoring Bps more regularly and running 140-169/90s. Dx'd with HTN in her 20's. Prev on ACEi/HCTZ several years ago. Aldactone replaced it by gyn due to hair loss. This was apparently not enough to control BP so valsartan was added to the aldactone.  Most recent potassium level 5.3.  Renal function stable.  Patient also mentioned family history of hypertrophic cardiomyopathy, states she mention this at her last checkup with Dr. Arora.  Denies exertional chest pain, dyspnea, lightheadedness or dizziness.  Does report she had an echocardiogram for she cannot recall, this was completed at least 12 to 15 years ago and was normal per patient.      Review of Systems   Constitutional:  Negative for activity change, appetite change and fever.   HENT:  Negative for trouble swallowing.    Respiratory:  Negative for apnea, cough, chest tightness and shortness of breath.    Cardiovascular:  Negative for chest pain, palpitations and leg swelling.   Gastrointestinal:  Negative for abdominal pain.   Musculoskeletal:  Negative for back pain and gait problem.   Neurological:  Negative for dizziness and  "light-headedness.       Current Outpatient Medications on File Prior to Visit   Medication Sig   • B Complex-C (Vitamin B + C Complex) TABS Take by mouth   • Bacillus Coagulans-Inulin (Probiotic) 1-250 BILLION-MG CAPS    • Biotin 02409 MCG TBDP Take 10,000 mcg by mouth daily   • Cholecalciferol (Vitamin D3) 1.25 MG (27982 UT) TABS    • Coenzyme Q10 (CoQ10) 200 MG CAPS    • levothyroxine 50 mcg tablet Take 1 tablet (50 mcg total) by mouth daily   • lisinopril-hydrochlorothiazide (PRINZIDE,ZESTORETIC) 20-12.5 MG per tablet Take 1 tablet by mouth daily   • metFORMIN (GLUCOPHAGE) 500 mg tablet Take 1 tablet (500 mg total) by mouth daily with breakfast   • Omega-3 Fatty Acids (fish oil) 1,000 mg Take 1,000 mg by mouth daily   • rosuvastatin (CRESTOR) 5 mg tablet    • spironolactone (ALDACTONE) 25 mg tablet Take 1 tablet (25 mg total) by mouth daily   • [DISCONTINUED] valsartan (DIOVAN) 80 mg tablet Take 1 tablet (80 mg total) by mouth daily       Objective     /82   Pulse 80   Ht 5' 3\" (1.6 m)   Wt 79.4 kg (175 lb)   SpO2 96%   BMI 31.00 kg/m²     Physical Exam  Vitals and nursing note reviewed.   Constitutional:       Appearance: Normal appearance. She is normal weight.   Musculoskeletal:         General: Normal range of motion.   Skin:     General: Skin is warm.   Neurological:      General: No focal deficit present.      Mental Status: She is alert. Mental status is at baseline.      Cranial Nerves: No cranial nerve deficit.   Psychiatric:         Mood and Affect: Mood normal.         Behavior: Behavior normal.         Thought Content: Thought content normal.         Judgment: Judgment normal.       Evelyn Machado, DO    "

## 2024-06-03 ENCOUNTER — VBI (OUTPATIENT)
Dept: ADMINISTRATIVE | Facility: OTHER | Age: 69
End: 2024-06-03

## 2024-06-03 LAB
LEFT EYE DIABETIC RETINOPATHY: NORMAL
RIGHT EYE DIABETIC RETINOPATHY: NORMAL
SEVERITY (EYE EXAM): NORMAL

## 2024-06-03 NOTE — TELEPHONE ENCOUNTER
Upon review of the In Basket request we were able to locate, review, and update the patient chart as requested for the diabetic item. This update will NOT reflect in the HM Activity until a Provider updates the Problem List with the appropriate Diabetic diagnosis code.     Any additional questions or concerns should be emailed to the Practice Liaisons via the appropriate education email address, please do not reply via In Basket.    Thank you  Mikayla Chapman MA   [facility]      Upon review of the In Basket request we were able to locate, review, and update the patient chart as requested for the diabetic item. This update will NOT reflect in the HM Activity until a Provider updates the Problem List with the appropriate Diabetic diagnosis code.     Any additional questions or concerns should be emailed to the Practice Liaisons via the appropriate education email address, please do not reply via In Basket.    Thank you  Mikayla Chapman MA   [facility]

## 2024-07-10 ENCOUNTER — RA CDI HCC (OUTPATIENT)
Dept: OTHER | Facility: HOSPITAL | Age: 69
End: 2024-07-10

## 2024-07-17 ENCOUNTER — OFFICE VISIT (OUTPATIENT)
Dept: FAMILY MEDICINE CLINIC | Facility: CLINIC | Age: 69
End: 2024-07-17
Payer: MEDICARE

## 2024-07-17 VITALS
HEART RATE: 88 BPM | SYSTOLIC BLOOD PRESSURE: 138 MMHG | BODY MASS INDEX: 29.95 KG/M2 | DIASTOLIC BLOOD PRESSURE: 80 MMHG | HEIGHT: 63 IN | WEIGHT: 169 LBS | TEMPERATURE: 98.6 F | OXYGEN SATURATION: 99 %

## 2024-07-17 DIAGNOSIS — Z82.49 FAMILY HISTORY OF HYPERTROPHIC CARDIOMYOPATHY: ICD-10-CM

## 2024-07-17 DIAGNOSIS — I10 ESSENTIAL HYPERTENSION: Primary | ICD-10-CM

## 2024-07-17 DIAGNOSIS — E78.2 MIXED HYPERLIPIDEMIA: ICD-10-CM

## 2024-07-17 DIAGNOSIS — G47.33 OBSTRUCTIVE SLEEP APNEA SYNDROME: ICD-10-CM

## 2024-07-17 DIAGNOSIS — I10 HYPERTENSION, UNSPECIFIED TYPE: ICD-10-CM

## 2024-07-17 DIAGNOSIS — E03.9 ACQUIRED HYPOTHYROIDISM: ICD-10-CM

## 2024-07-17 DIAGNOSIS — R73.03 PREDIABETES: ICD-10-CM

## 2024-07-17 PROCEDURE — G2211 COMPLEX E/M VISIT ADD ON: HCPCS | Performed by: INTERNAL MEDICINE

## 2024-07-17 PROCEDURE — 99214 OFFICE O/P EST MOD 30 MIN: CPT | Performed by: INTERNAL MEDICINE

## 2024-07-17 NOTE — ASSESSMENT & PLAN NOTE
Will continue current medical regimen, have asked her to get her blood work tomorrow rather than in August.  Monitoring her potassium.

## 2024-07-17 NOTE — PROGRESS NOTES
Ambulatory Visit  Name: Vicky L Kocher      : 1955      MRN: [id number]  Encounter Provider: Chuy Arora DO  Encounter Date: 2024   Encounter department: [facility]    Assessment & Plan   1. Essential hypertension  Assessment & Plan:  Will continue current medical regimen, have asked her to get her blood work tomorrow rather than in August.  Monitoring her potassium.  Orders:  -     Echo complete w/ contrast if indicated; Future; Expected date: 2024  2. Acquired hypothyroidism  Assessment & Plan:  Labs now, continue on replacement.    Orders:  -     T4, free; Future  -     TSH, 3rd generation; Future  3. Family history of hypertrophic cardiomyopathy  Assessment & Plan:  Check 2D echo, consider beta-blockade should she have any evidence for HOCM.  Orders:  -     Echo complete w/ contrast if indicated; Future; Expected date: 2024  4. Prediabetes  Assessment & Plan:  Labs in near future.  Orders:  -     Hemoglobin A1C; Future  5. Mixed hyperlipidemia  Assessment & Plan:  Continues on low intensity statin.    Orders:  -     Lipid Panel with Direct LDL reflex; Future  6. Hypertension, unspecified type  -     CBC and differential; Future  -     Comprehensive metabolic panel; Future  7. Obstructive sleep apnea syndrome  Assessment & Plan:  Remains compliant with CPAP at this point.       History of Present Illness     Feeling better since had her blood pressure medicines changed.  She denies chest pain, shortness of breath.  Was having headaches and lightheadedness.  Really was not feeling well well on the valsartan, states her blood pressure is also high.  She has recently been started on spironolactone, and now is back on lisinopril/hydrochlorothiazide.  Has not had her potassium rechecked since then.  She has no chest pain to speak of, denies shortness of breath.  Recently her sister was diagnosed with hypertrophic obstructive cardiomyopathy.  She has no symptoms  "like her sister at all.  She remains active, exercising and stretching.  There has been no change in bowel or bladder habits.  He is experienced no falls.  Today from ER blood pressure was 126/82, her blood pressure cuff showed 132/80.        Review of Systems   Constitutional:  Negative for chills and fever.   HENT:  Negative for rhinorrhea, sinus pain and sore throat.    Eyes:  Negative for visual disturbance.   Respiratory:  Negative for cough and shortness of breath.    Cardiovascular:  Negative for chest pain and leg swelling.   Gastrointestinal:  Negative for abdominal pain, diarrhea, nausea and vomiting.   Genitourinary:  Negative for dysuria.   Musculoskeletal:  Positive for arthralgias. Negative for back pain and myalgias.   Skin:  Negative for rash.   Neurological:  Positive for dizziness and headaches. Negative for light-headedness.   Psychiatric/Behavioral:  Negative for confusion.    All other systems reviewed and are negative.      Objective     /80 (BP Location: Left arm, Patient Position: Sitting, Cuff Size: Large)   Pulse 88   Temp 98.6 °F (37 °C) (Tympanic)   Ht 5' 3\" (1.6 m)   Wt 76.7 kg (169 lb)   SpO2 99%   BMI 29.94 kg/m²     Physical Exam  Vitals and nursing note reviewed.   Constitutional:       General: She is not in acute distress.     Appearance: Normal appearance. She is well-developed.   HENT:      Head: Normocephalic and atraumatic.   Eyes:      Conjunctiva/sclera: Conjunctivae normal.   Cardiovascular:      Rate and Rhythm: Normal rate and regular rhythm.      Pulses: Normal pulses.      Heart sounds: Normal heart sounds. No murmur heard.  Pulmonary:      Effort: Pulmonary effort is normal. No respiratory distress.      Breath sounds: Normal breath sounds.   Abdominal:      Palpations: Abdomen is soft.      Tenderness: There is no abdominal tenderness.   Musculoskeletal:         General: No swelling. Normal range of motion.      Cervical back: Normal range of motion and " neck supple.   Skin:     General: Skin is warm and dry.      Capillary Refill: Capillary refill takes less than 2 seconds.   Neurological:      General: No focal deficit present.      Mental Status: She is alert and oriented to person, place, and time.   Psychiatric:         Mood and Affect: Mood normal.         Behavior: Behavior normal.       Administrative Statements

## 2024-08-07 ENCOUNTER — TELEPHONE (OUTPATIENT)
Age: 69
End: 2024-08-07

## 2024-08-07 ENCOUNTER — TELEPHONE (OUTPATIENT)
Dept: FAMILY MEDICINE CLINIC | Facility: CLINIC | Age: 69
End: 2024-08-07

## 2024-08-07 DIAGNOSIS — L30.9 DERMATITIS: Primary | ICD-10-CM

## 2024-08-07 RX ORDER — TRIAMCINOLONE ACETONIDE 5 MG/G
CREAM TOPICAL 3 TIMES DAILY
Qty: 45 G | Refills: 2 | Status: SHIPPED | OUTPATIENT
Start: 2024-08-07 | End: 2024-08-07

## 2024-08-07 RX ORDER — TRIAMCINOLONE ACETONIDE 5 MG/G
CREAM TOPICAL 3 TIMES DAILY
Qty: 45 G | Refills: 2 | Status: SHIPPED | OUTPATIENT
Start: 2024-08-07

## 2024-08-07 NOTE — TELEPHONE ENCOUNTER
Notify patient labs were reviewed.  Her thyroid is little off but this likely because of her biotin, would continue current regimen until I see her in September and I will talk to her about it then.

## 2024-08-07 NOTE — TELEPHONE ENCOUNTER
Patient called asking for a prescription for a cream for poison ivy. The last time she had the cream was in 2020. Patient would like a call back form someone in the office.

## 2024-08-08 ENCOUNTER — HOSPITAL ENCOUNTER (OUTPATIENT)
Dept: NON INVASIVE DIAGNOSTICS | Facility: HOSPITAL | Age: 69
Discharge: HOME | End: 2024-08-08
Attending: INTERNAL MEDICINE
Payer: MEDICARE

## 2024-08-08 VITALS
WEIGHT: 169 LBS | HEART RATE: 72 BPM | BODY MASS INDEX: 29.95 KG/M2 | HEIGHT: 63 IN | DIASTOLIC BLOOD PRESSURE: 80 MMHG | SYSTOLIC BLOOD PRESSURE: 138 MMHG

## 2024-08-08 DIAGNOSIS — I10 ESSENTIAL HYPERTENSION: ICD-10-CM

## 2024-08-08 DIAGNOSIS — Z82.49 FAMILY HISTORY OF HYPERTROPHIC CARDIOMYOPATHY: ICD-10-CM

## 2024-08-08 LAB
AORTIC ROOT: 2.4 CM
APICAL FOUR CHAMBER EJECTION FRACTION: 60 %
ASCENDING AORTA: 2.8 CM
BSA FOR ECHO PROCEDURE: 1.8 M2
E WAVE DECELERATION TIME: 294 MS
E/A RATIO: 0.78
FRACTIONAL SHORTENING: 43 (ref 28–44)
INTERVENTRICULAR SEPTUM IN DIASTOLE (PARASTERNAL SHORT AXIS VIEW): 1 CM
INTERVENTRICULAR SEPTUM: 1 CM (ref 0.6–1.1)
IVC: 17 MM
LAAS-AP2: 17.3 CM2
LAAS-AP4: 11.1 CM2
LEFT ATRIUM SIZE: 3.1 CM
LEFT ATRIUM VOLUME (MOD BIPLANE): 39 ML
LEFT ATRIUM VOLUME INDEX (MOD BIPLANE): 21.7 ML/M2
LEFT INTERNAL DIMENSION IN SYSTOLE: 2.1 CM (ref 2.1–4)
LEFT VENTRICULAR INTERNAL DIMENSION IN DIASTOLE: 3.7 CM (ref 3.5–6)
LEFT VENTRICULAR POSTERIOR WALL IN END DIASTOLE: 0.9 CM
LEFT VENTRICULAR STROKE VOLUME: 42 ML
LVSV (TEICH): 42 ML
MV E'TISSUE VEL-SEP: 7 CM/S
MV PEAK A VEL: 0.92 M/S
MV PEAK E VEL: 72 CM/S
MV STENOSIS PRESSURE HALF TIME: 85 MS
MV VALVE AREA P 1/2 METHOD: 2.6
RIGHT ATRIUM AREA SYSTOLE A4C: 6.7 CM2
RIGHT VENTRICLE ID DIMENSION: 2.7 CM
SL CV LEFT ATRIUM LENGTH A2C: 5.1 CM
SL CV LV EF: 65
SL CV PED ECHO LEFT VENTRICLE DIASTOLIC VOLUME (MOD BIPLANE) 2D: 58 ML
SL CV PED ECHO LEFT VENTRICLE SYSTOLIC VOLUME (MOD BIPLANE) 2D: 15 ML
TRICUSPID ANNULAR PLANE SYSTOLIC EXCURSION: 2.5 CM

## 2024-08-08 PROCEDURE — 93306 TTE W/DOPPLER COMPLETE: CPT | Performed by: INTERNAL MEDICINE

## 2024-08-08 PROCEDURE — 93306 TTE W/DOPPLER COMPLETE: CPT

## 2024-08-11 DIAGNOSIS — I10 ESSENTIAL HYPERTENSION: ICD-10-CM

## 2024-08-11 DIAGNOSIS — E03.9 ACQUIRED HYPOTHYROIDISM: ICD-10-CM

## 2024-08-12 RX ORDER — LEVOTHYROXINE SODIUM 50 UG/1
50 TABLET ORAL DAILY
Qty: 90 TABLET | Refills: 1 | Status: SHIPPED | OUTPATIENT
Start: 2024-08-12

## 2024-08-12 RX ORDER — SPIRONOLACTONE 25 MG/1
25 TABLET ORAL DAILY
Qty: 90 TABLET | Refills: 1 | Status: SHIPPED | OUTPATIENT
Start: 2024-08-12

## 2024-08-23 DIAGNOSIS — I10 ESSENTIAL HYPERTENSION: Primary | ICD-10-CM

## 2024-08-23 RX ORDER — LISINOPRIL AND HYDROCHLOROTHIAZIDE 12.5; 2 MG/1; MG/1
1 TABLET ORAL DAILY
Qty: 90 TABLET | Refills: 3 | Status: SHIPPED | OUTPATIENT
Start: 2024-08-23

## 2024-08-23 NOTE — TELEPHONE ENCOUNTER
Reason for call:   [x] Refill   [] Prior Auth  [] Other:     Office:   [x] PCP/Provider -  Chuy Arora, DO   [] Specialty/Provider -     Medication: lisinopril-hydrochlorothiazide (PRINZIDE,ZESTORETIC) 20-12.5 MG per tablet /   Take 1 tablet by mouth daily    Pharmacy: [facility] - [address]      Does the patient have enough for 3 days?   [x] Yes   [] No - Send as HP to POD

## 2024-09-05 ENCOUNTER — OFFICE VISIT (OUTPATIENT)
Dept: FAMILY MEDICINE CLINIC | Facility: CLINIC | Age: 69
End: 2024-09-05
Payer: MEDICARE

## 2024-09-05 VITALS
DIASTOLIC BLOOD PRESSURE: 70 MMHG | TEMPERATURE: 97.3 F | OXYGEN SATURATION: 99 % | HEIGHT: 64 IN | BODY MASS INDEX: 29.02 KG/M2 | HEART RATE: 77 BPM | WEIGHT: 170 LBS | SYSTOLIC BLOOD PRESSURE: 136 MMHG

## 2024-09-05 DIAGNOSIS — G47.33 OBSTRUCTIVE SLEEP APNEA SYNDROME: ICD-10-CM

## 2024-09-05 DIAGNOSIS — E03.9 ACQUIRED HYPOTHYROIDISM: ICD-10-CM

## 2024-09-05 DIAGNOSIS — E78.2 MIXED HYPERLIPIDEMIA: ICD-10-CM

## 2024-09-05 DIAGNOSIS — R73.03 PREDIABETES: ICD-10-CM

## 2024-09-05 DIAGNOSIS — L65.9 ALOPECIA: ICD-10-CM

## 2024-09-05 DIAGNOSIS — I10 ESSENTIAL HYPERTENSION: ICD-10-CM

## 2024-09-05 DIAGNOSIS — H65.112 ACUTE ALLERGIC SEROUS OTITIS MEDIA, LEFT: ICD-10-CM

## 2024-09-05 DIAGNOSIS — Z00.00 MEDICARE ANNUAL WELLNESS VISIT, SUBSEQUENT: Primary | ICD-10-CM

## 2024-09-05 PROCEDURE — G0439 PPPS, SUBSEQ VISIT: HCPCS | Performed by: INTERNAL MEDICINE

## 2024-09-05 NOTE — ASSESSMENT & PLAN NOTE
Continue current medical therapy, continue to monitor potassium.  Notably was tried on an ARB in its place with no success.  May need to change classes if she has issues.  Currently remains high normal.

## 2024-09-05 NOTE — PATIENT INSTRUCTIONS
Medicare Preventive Visit Patient Instructions  Thank you for completing your Welcome to Medicare Visit or Medicare Annual Wellness Visit today. Your next wellness visit will be due in one year (9/6/2025).  The screening/preventive services that you may require over the next 5-10 years are detailed below. Some tests may not apply to you based off risk factors and/or age. Screening tests ordered at today's visit but not completed yet may show as past due. Also, please note that scanned in results may not display below.  Preventive Screenings:  Service Recommendations Previous Testing/Comments   Colorectal Cancer Screening  * Colonoscopy    * Fecal Occult Blood Test (FOBT)/Fecal Immunochemical Test (FIT)  * Fecal DNA/Cologuard Test  * Flexible Sigmoidoscopy Age: 45-75 years old   Colonoscopy: every 10 years (may be performed more frequently if at higher risk)  OR  FOBT/FIT: every 1 year  OR  Cologuard: every 3 years  OR  Sigmoidoscopy: every 5 years  Screening may be recommended earlier than age 45 if at higher risk for colorectal cancer. Also, an individualized decision between you and your healthcare provider will decide whether screening between the ages of 76-85 would be appropriate. Colonoscopy: Not on file  FOBT/FIT: Not on file  Cologuard: 03/25/2024  Sigmoidoscopy: Not on file          Breast Cancer Screening Age: 40+ years old  Frequency: every 1-2 years  Not required if history of left and right mastectomy Mammogram: 12/01/2023        Cervical Cancer Screening Between the ages of 21-29, pap smear recommended once every 3 years.   Between the ages of 30-65, can perform pap smear with HPV co-testing every 5 years.   Recommendations may differ for women with a history of total hysterectomy, cervical cancer, or abnormal pap smears in past. Pap Smear: Not on file        Hepatitis C Screening Once for adults born between 1945 and 1965  More frequently in patients at high risk for Hepatitis C Hep C Antibody:  11/04/2017        Diabetes Screening 1-2 times per year if you're at risk for diabetes or have pre-diabetes Fasting glucose: No results in last 5 years (No results in last 5 years)  A1C: 6.5 % (7/19/2024)      Cholesterol Screening Once every 5 years if you don't have a lipid disorder. May order more often based on risk factors. Lipid panel: 12/22/2022          Other Preventive Screenings Covered by Medicare:  Abdominal Aortic Aneurysm (AAA) Screening: covered once if your at risk. You're considered to be at risk if you have a family history of AAA.  Lung Cancer Screening: covers low dose CT scan once per year if you meet all of the following conditions: (1) Age 55-77; (2) No signs or symptoms of lung cancer; (3) Current smoker or have quit smoking within the last 15 years; (4) You have a tobacco smoking history of at least 20 pack years (packs per day multiplied by number of years you smoked); (5) You get a written order from a healthcare provider.  Glaucoma Screening: covered annually if you're considered high risk: (1) You have diabetes OR (2) Family history of glaucoma OR (3)  aged 50 and older OR (4)  American aged 65 and older  Osteoporosis Screening: covered every 2 years if you meet one of the following conditions: (1) You're estrogen deficient and at risk for osteoporosis based off medical history and other findings; (2) Have a vertebral abnormality; (3) On glucocorticoid therapy for more than 3 months; (4) Have primary hyperparathyroidism; (5) On osteoporosis medications and need to assess response to drug therapy.   Last bone density test (DXA Scan): 07/31/2023.  HIV Screening: covered annually if you're between the age of 15-65. Also covered annually if you are younger than 15 and older than 65 with risk factors for HIV infection. For pregnant patients, it is covered up to 3 times per pregnancy.    Immunizations:  Immunization Recommendations   Influenza Vaccine Annual influenza  vaccination during flu season is recommended for all persons aged >= 6 months who do not have contraindications   Pneumococcal Vaccine   * Pneumococcal conjugate vaccine = PCV13 (Prevnar 13), PCV15 (Vaxneuvance), PCV20 (Prevnar 20)  * Pneumococcal polysaccharide vaccine = PPSV23 (Pneumovax) Adults 19-65 yo with certain risk factors or if 65+ yo  If never received any pneumonia vaccine: recommend Prevnar 20 (PCV20)  Give PCV20 if previously received 1 dose of PCV13 or PPSV23   Hepatitis B Vaccine 3 dose series if at intermediate or high risk (ex: diabetes, end stage renal disease, liver disease)   Respiratory syncytial virus (RSV) Vaccine - COVERED BY MEDICARE PART D  * RSVPreF3 (Arexvy) CDC recommends that adults 60 years of age and older may receive a single dose of RSV vaccine using shared clinical decision-making (SCDM)   Tetanus (Td) Vaccine - COST NOT COVERED BY MEDICARE PART B Following completion of primary series, a booster dose should be given every 10 years to maintain immunity against tetanus. Td may also be given as tetanus wound prophylaxis.   Tdap Vaccine - COST NOT COVERED BY MEDICARE PART B Recommended at least once for all adults. For pregnant patients, recommended with each pregnancy.   Shingles Vaccine (Shingrix) - COST NOT COVERED BY MEDICARE PART B  2 shot series recommended in those 19 years and older who have or will have weakened immune systems or those 50 years and older     Health Maintenance Due:      Topic Date Due   • Breast Cancer Screening: Mammogram  12/01/2025   • Colorectal Cancer Screening  03/25/2027   • Hepatitis C Screening  Completed     Immunizations Due:      Topic Date Due   • COVID-19 Vaccine (9 - 2023-24 season) 09/01/2024   • Influenza Vaccine (1) 09/01/2024     Advance Directives   What are advance directives?  Advance directives are legal documents that state your wishes and plans for medical care. These plans are made ahead of time in case you lose your ability to make  decisions for yourself. Advance directives can apply to any medical decision, such as the treatments you want, and if you want to donate organs.   What are the types of advance directives?  There are many types of advance directives, and each state has rules about how to use them. You may choose a combination of any of the following:  Living will:  This is a written record of the treatment you want. You can also choose which treatments you do not want, which to limit, and which to stop at a certain time. This includes surgery, medicine, IV fluid, and tube feedings.   Durable power of  for healthcare (DPAHC):  This is a written record that states who you want to make healthcare choices for you when you are unable to make them for yourself. This person, called a proxy, is usually a family member or a friend. You may choose more than 1 proxy.  Do not resuscitate (DNR) order:  A DNR order is used in case your heart stops beating or you stop breathing. It is a request not to have certain forms of treatment, such as CPR. A DNR order may be included in other types of advance directives.  Medical directive:  This covers the care that you want if you are in a coma, near death, or unable to make decisions for yourself. You can list the treatments you want for each condition. Treatment may include pain medicine, surgery, blood transfusions, dialysis, IV or tube feedings, and a ventilator (breathing machine).  Values history:  This document has questions about your views, beliefs, and how you feel and think about life. This information can help others choose the care that you would choose.  Why are advance directives important?  An advance directive helps you control your care. Although spoken wishes may be used, it is better to have your wishes written down. Spoken wishes can be misunderstood, or not followed. Treatments may be given even if you do not want them. An advance directive may make it easier for your family  to make difficult choices about your care.   Weight Management   Why it is important to manage your weight:  Being overweight increases your risk of health conditions such as heart disease, high blood pressure, type 2 diabetes, and certain types of cancer. It can also increase your risk for osteoarthritis, sleep apnea, and other respiratory problems. Aim for a slow, steady weight loss. Even a small amount of weight loss can lower your risk of health problems.  How to lose weight safely:  A safe and healthy way to lose weight is to eat fewer calories and get regular exercise. You can lose up about 1 pound a week by decreasing the number of calories you eat by 500 calories each day.   Healthy meal plan for weight management:  A healthy meal plan includes a variety of foods, contains fewer calories, and helps you stay healthy. A healthy meal plan includes the following:  Eat whole-grain foods more often.  A healthy meal plan should contain fiber. Fiber is the part of grains, fruits, and vegetables that is not broken down by your body. Whole-grain foods are healthy and provide extra fiber in your diet. Some examples of whole-grain foods are whole-wheat breads and pastas, oatmeal, brown rice, and bulgur.  Eat a variety of vegetables every day.  Include dark, leafy greens such as spinach, kale, michelle greens, and mustard greens. Eat yellow and orange vegetables such as carrots, sweet potatoes, and winter squash.   Eat a variety of fruits every day.  Choose fresh or canned fruit (canned in its own juice or light syrup) instead of juice. Fruit juice has very little or no fiber.  Eat low-fat dairy foods.  Drink fat-free (skim) milk or 1% milk. Eat fat-free yogurt and low-fat cottage cheese. Try low-fat cheeses such as mozzarella and other reduced-fat cheeses.  Choose meat and other protein foods that are low in fat.  Choose beans or other legumes such as split peas or lentils. Choose fish, skinless poultry (chicken or  turkey), or lean cuts of red meat (beef or pork). Before you cook meat or poultry, cut off any visible fat.   Use less fat and oil.  Try baking foods instead of frying them. Add less fat, such as margarine, sour cream, regular salad dressing and mayonnaise to foods. Eat fewer high-fat foods. Some examples of high-fat foods include french fries, doughnuts, ice cream, and cakes.  Eat fewer sweets.  Limit foods and drinks that are high in sugar. This includes candy, cookies, regular soda, and sweetened drinks.  Exercise:  Exercise at least 30 minutes per day on most days of the week. Some examples of exercise include walking, biking, dancing, and swimming. You can also fit in more physical activity by taking the stairs instead of the elevator or parking farther away from stores. Ask your healthcare provider about the best exercise plan for you.      © Copyright [employer] 2018 Information is for End User's use only and may not be sold, redistributed or otherwise used for commercial purposes. All illustrations and images included in CareNotes® are the copyrighted property of [employer] or [employer]

## 2024-09-05 NOTE — PROGRESS NOTES
Ambulatory Visit  Name: Vicky L Kocher      : 1955      MRN: [id number]  Encounter Provider: Chuy Arora DO  Encounter Date: 2024   Encounter department: [facility]    Assessment & Plan   1. Medicare annual wellness visit, subsequent  Assessment & Plan:  Reviewed the AWV questionnaire.  Went through standard need for appropriate screening tests based on risk factors..  Reviewed cognitive issues.  Reviewed living will and DURABLE POWER OF .  Discussed healthy lifestyle, healthy diet.  Reviewed vaccines.  Encourage exercise.  Discussed safety issues within the home and about.   2. Essential hypertension  Assessment & Plan:  Continue current medical therapy, continue to monitor potassium.  Notably was tried on an ARB in its place with no success.  May need to change classes if she has issues.  Currently remains high normal.  Orders:  -     CBC and differential; Future; Expected date: 2025  -     Comprehensive metabolic panel; Future; Expected date: 2025  -     Lipid Panel with Direct LDL reflex; Future; Expected date: 2025  -     CBC and differential  -     Comprehensive metabolic panel  -     Lipid Panel with Direct LDL reflex  3. Obstructive sleep apnea syndrome  Assessment & Plan:  Remains compliant with CPAP and feels it does help her.  4. Acquired hypothyroidism  Assessment & Plan:  Recheck labs before next appointment, continue current replacement therapy.  Orders:  -     TSH, 3rd generation; Future; Expected date: 2025  -     T4, free; Future; Expected date: 2025  -     TSH, 3rd generation  -     T4, free  5. Alopecia  Assessment & Plan:  Improved on spironolactone, continue to monitor potassium on her current blood pressure meds.  6. Prediabetes  Assessment & Plan:  Remains on metformin 1 tablet daily.    Orders:  -     Hemoglobin A1C; Future; Expected date: 2025  -     Hemoglobin A1C  7. Mixed hyperlipidemia  Assessment &  Plan:  Remains on low intensity statin.  Lipids at goal.  Orders:  -     Lipid Panel with Direct LDL reflex; Future; Expected date: 03/05/2025  -     Lipid Panel with Direct LDL reflex  8. Acute allergic serous otitis media, left  Comments:  No antibiotics at this time.  May try Claritin over-the-counter or some of the over-the-counter decongestants.      Depression Screening and Follow-up Plan: Patient was screened for depression during today's encounter. They screened negative with a PHQ-2 score of 0.    Urinary Incontinence Plan of Care: counseling topics discussed: practice Kegel (pelvic floor strengthening) exercises, limiting fluid intake 3-4 hours before bed and weight loss.       Preventive health issues were discussed with patient, and age appropriate screening tests were ordered as noted in patient's After Visit Summary. Personalized health advice and appropriate referrals for health education or preventive services given if needed, as noted in patient's After Visit Summary.    History of Present Illness     .Patient presents for their annual medical wellness visit.  Reviewed medical intake questionnaire.  Discussed living will and DURABLE POWER OF .  Discussed importance of these 2 documents.  Reviewed the various screening modalities the patient was due for.  Reviewed home safety as well as depression and risk of falling.  Through shared medical decision making move forward with testing or blood work as ordered.  The patient presents with really no other complaints.  Tolerating medications.  Potassium has been doing reasonably well.  Noting that she is on both lisinopril and spironolactone.  She is also taking hydrochlorothiazide..   Noted patient had a mild amount of left ear discomfort comfort, thought to may be fluid on it.       Patient Care Team:  Chuy Arora DO as PCP - General (Family Medicine)    Review of Systems   Constitutional:  Negative for chills and fever.   HENT:  Negative  for rhinorrhea and sore throat.    Eyes:  Negative for visual disturbance.   Respiratory:  Negative for cough and shortness of breath.    Cardiovascular:  Negative for chest pain and leg swelling.   Gastrointestinal:  Negative for abdominal pain, diarrhea, nausea and vomiting.   Genitourinary:  Negative for dysuria.   Musculoskeletal:  Negative for back pain and myalgias.   Skin:  Negative for rash.   Neurological:  Negative for dizziness and headaches.   Psychiatric/Behavioral:  Negative for confusion.    All other systems reviewed and are negative.    Medical History Reviewed by provider this encounter:       Annual Wellness Visit Questionnaire   Kisha is here for her Subsequent Wellness visit. Last Medicare Wellness visit information reviewed, patient interviewed, no change since last AWV.     Health Risk Assessment:   Patient rates overall health as very good. Patient feels that their physical health rating is same. Patient is very satisfied with their life. Eyesight was rated as same. Hearing was rated as same. Patient feels that their emotional and mental health rating is same. Patients states they are never, rarely angry. Patient states they are never, rarely unusually tired/fatigued. Pain experienced in the last 7 days has been none. Patient states that she has experienced no weight loss or gain in last 6 months.     Depression Screening:   PHQ-2 Score: 0      Fall Risk Screening:   In the past year, patient has experienced: no history of falling in past year      Urinary Incontinence Screening:   Patient has leaked urine accidently in the last six months.     Home Safety:  Patient does not have trouble with stairs inside or outside of their home. Patient has working smoke alarms and has working carbon monoxide detector. Home safety hazards include: none.     Nutrition:   Current diet is Regular.     Medications:   Patient is currently taking over-the-counter supplements. OTC medications include: see  medication list. Patient is able to manage medications.     Activities of Daily Living (ADLs)/Instrumental Activities of Daily Living (IADLs):   Walk and transfer into and out of bed and chair?: Yes  Dress and groom yourself?: Yes    Bathe or shower yourself?: Yes    Feed yourself? Yes  Do your laundry/housekeeping?: Yes  Manage your money, pay your bills and track your expenses?: Yes  Make your own meals?: Yes    Do your own shopping?: Yes    Previous Hospitalizations:   Any hospitalizations or ED visits within the last 12 months?: No      Advance Care Planning:   Living will: Yes    Durable POA for healthcare: Yes    Advanced directive: Yes    Advanced directive counseling given: Yes    ACP document given: Yes    Patient declined ACP directive: No    End of Life Decisions reviewed with patient: Yes    Provider agrees with end of life decisions: Yes      Cognitive Screening:   Provider or family/friend/caregiver concerned regarding cognition?: No    PREVENTIVE SCREENINGS      Cardiovascular Screening:    General: Screening Not Indicated and History Lipid Disorder      Diabetes Screening:     General: Screening Current      Colorectal Cancer Screening:     General: Screening Current      Breast Cancer Screening:     General: Screening Current      Cervical Cancer Screening:    General: Screening Not Indicated      Osteoporosis Screening:    General: Screening Current      Abdominal Aortic Aneurysm (AAA) Screening:        General: Screening Not Indicated      Lung Cancer Screening:     General: Screening Not Indicated      Hepatitis C Screening:    General: Screening Current    Screening, Brief Intervention, and Referral to Treatment (SBIRT)    Screening  Typical number of drinks in a day: 0  Typical number of drinks in a week: 3  Interpretation: Low risk drinking behavior.    AUDIT-C Screenin) How often did you have a drink containing alcohol in the past year? 2 to 3 times a week  2) How many drinks did you  have on a typical day when you were drinking in the past year? 1 to 2  3) How often did you have 6 or more drinks on one occasion in the past year? never    AUDIT-C Score: 3  Interpretation: Score 3-12 (female): POSITIVE screen for alcohol misuse    AUDIT Screenin) How often during the last year have you found that you were not able to stop drinking once you had started? 0 - never  5) How often during the last year have you failed to do what was normally expected from you because of drinking? 0 - never  6) How often during the last year have you needed a first drink in the morning to get yourself going after a heavy drinking session? 0 - never  7) How often during the last year have you had a feeling of guilt or remorse after drinking? 0 - never  8) How often during the last year have you been unable to remember what happened the night before because you had been drinking? 0 - never  9) Have you or someone else been injured as a result of your drinking? 0 - no  10) Has a relative or friend or a doctor or another health worker been concerned about your drinking or suggested you cut down? 0 - no    AUDIT Score: 3  Interpretation: Low risk alcohol consumption    Single Item Drug Screening:  How often have you used an illegal drug (including marijuana) or a prescription medication for non-medical reasons in the past year? never    Single Item Drug Screen Score: 0  Interpretation: Negative screen for possible drug use disorder    Other Counseling Topics:   Car/seat belt/driving safety and calcium and vitamin D intake and regular weightbearing exercise.     Social Determinants of Health     Financial Resource Strain: Low Risk  (2023)    Overall Financial Resource Strain (CARDIA)     Difficulty of Paying Living Expenses: Not hard at all   Food Insecurity: No Food Insecurity (2024)    Hunger Vital Sign     Worried About Running Out of Food in the Last Year: Never true     Ran Out of Food in the Last Year:  "Never true   Transportation Needs: No Transportation Needs (9/2/2024)    PRAPARE - Transportation     Lack of Transportation (Medical): No     Lack of Transportation (Non-Medical): No   Housing Stability: Low Risk  (9/2/2024)    Housing Stability Vital Sign     Unable to Pay for Housing in the Last Year: No     Number of Times Moved in the Last Year: 0     Homeless in the Last Year: No   Utilities: Not At Risk (9/2/2024)    [facility] Util[facility]     Threatened with loss of utilities: No     No results found.    Objective     /70 (BP Location: Left arm, Patient Position: Sitting, Cuff Size: Adult)   Pulse 77   Temp (!) 97.3 °F (36.3 °C) (Tympanic)   Ht 5' 4\" (1.626 m)   Wt 77.1 kg (170 lb)   SpO2 99%   BMI 29.18 kg/m²     Physical Exam  Vitals and nursing note reviewed.   Constitutional:       General: She is not in acute distress.     Appearance: She is well-developed.   HENT:      Head: Normocephalic and atraumatic.      Right Ear: Tympanic membrane normal.      Ears:      Comments: Serous otitis of the left ear  Eyes:      Conjunctiva/sclera: Conjunctivae normal.   Cardiovascular:      Rate and Rhythm: Normal rate and regular rhythm.      Pulses: Normal pulses.      Heart sounds: Normal heart sounds. No murmur heard.  Pulmonary:      Effort: Pulmonary effort is normal. No respiratory distress.      Breath sounds: Normal breath sounds.   Abdominal:      Palpations: Abdomen is soft.      Tenderness: There is no abdominal tenderness.   Musculoskeletal:         General: No swelling.      Cervical back: Neck supple.   Skin:     General: Skin is warm and dry.      Capillary Refill: Capillary refill takes less than 2 seconds.   Neurological:      General: No focal deficit present.      Mental Status: She is alert and oriented to person, place, and time.   Psychiatric:         Mood and Affect: Mood normal.           "

## 2024-10-02 ENCOUNTER — IMMUNIZATIONS (OUTPATIENT)
Dept: FAMILY MEDICINE CLINIC | Facility: CLINIC | Age: 69
End: 2024-10-02
Payer: MEDICARE

## 2024-10-02 DIAGNOSIS — Z23 ENCOUNTER FOR IMMUNIZATION: Primary | ICD-10-CM

## 2024-10-02 PROCEDURE — G0008 ADMIN INFLUENZA VIRUS VAC: HCPCS | Performed by: INTERNAL MEDICINE

## 2024-10-02 PROCEDURE — 90662 IIV NO PRSV INCREASED AG IM: CPT | Performed by: INTERNAL MEDICINE

## 2024-10-05 PROBLEM — Z00.00 MEDICARE ANNUAL WELLNESS VISIT, SUBSEQUENT: Status: RESOLVED | Noted: 2024-09-05 | Resolved: 2024-10-05

## 2024-10-22 DIAGNOSIS — R73.03 PREDIABETES: ICD-10-CM

## 2024-10-23 NOTE — ASSESSMENT & PLAN NOTE
----- Message from Delia Dodge MD sent at 10/20/2024  4:02 PM CDT -----  DEXA 10/18/2024  LS spine: T-score -3.0 consistent with osteoporosis.  Left hip: T-score -0.9  Goal T-score is > -1.0.  The patients bones are  much thinner and consistent with Osteoporosis.  They needs to take 1500 mg of calcium in divided doses (500 mg Three times a day or 600 mg twice a day) and 2000 IU of vitamin D daily.  The patient needs to do weight bearing exercise daily (such as walking or weight lifting). The patient should start on bisphophanates (alendronate, ibandronate, or risendronate) to help decrease the loss of their bones.   The patient may call the office for an appointment to discuss further.       She will consider 2D echo on next appointment.  No clinical symptoms and no evidence of obstruction on physical exam

## 2024-12-28 DIAGNOSIS — E03.9 ACQUIRED HYPOTHYROIDISM: ICD-10-CM

## 2024-12-28 DIAGNOSIS — I10 ESSENTIAL HYPERTENSION: ICD-10-CM

## 2024-12-30 RX ORDER — SPIRONOLACTONE 25 MG/1
25 TABLET ORAL DAILY
Qty: 90 TABLET | Refills: 1 | Status: SHIPPED | OUTPATIENT
Start: 2024-12-30

## 2024-12-30 RX ORDER — LEVOTHYROXINE SODIUM 50 UG/1
50 TABLET ORAL DAILY
Qty: 90 TABLET | Refills: 1 | Status: SHIPPED | OUTPATIENT
Start: 2024-12-30

## 2025-01-14 ENCOUNTER — TELEPHONE (OUTPATIENT)
Age: 70
End: 2025-01-14

## 2025-01-14 NOTE — TELEPHONE ENCOUNTER
Patient states she will be starting a Fitness Program with [facility] on 2/18. They are requiring forms filled out by Dr. Marsh stating she is okay to star. Patient wondering if appointment is needed or if he can just gomez out the forms. LOV 9/2024. Patient requests call back to advise if appointment is needed or not.

## 2025-01-20 DIAGNOSIS — E78.2 MIXED HYPERLIPIDEMIA: Primary | ICD-10-CM

## 2025-01-20 NOTE — TELEPHONE ENCOUNTER
Reason for call:   [x] Refill   [] Prior Auth  [x] Other: HISTORICAL PROVIDER    Office:   [x] PCP/Provider - Chuy Arora  [] Specialty/Provider -     Medication: rosuvastatin (CRESTOR) 5 mg tablet     Dose/Frequency: Patient report 1 tab daily    Quantity: 90    Pharmacy: [facility] - [address]      Does the patient have enough for 3 days?   [x] Yes   [] No - Send as HP to POD

## 2025-01-21 RX ORDER — ROSUVASTATIN CALCIUM 5 MG/1
5 TABLET, COATED ORAL DAILY
Qty: 90 TABLET | Refills: 3 | Status: SHIPPED | OUTPATIENT
Start: 2025-01-21

## 2025-03-22 DIAGNOSIS — R73.03 PREDIABETES: ICD-10-CM

## 2025-03-24 ENCOUNTER — APPOINTMENT (OUTPATIENT)
Dept: LAB | Facility: HOSPITAL | Age: 70
End: 2025-03-24
Payer: MEDICARE

## 2025-03-24 LAB
ALBUMIN SERPL BCG-MCNC: 4.8 G/DL (ref 3.5–5)
ALP SERPL-CCNC: 47 U/L (ref 34–104)
ALT SERPL W P-5'-P-CCNC: 23 U/L (ref 7–52)
ANION GAP SERPL CALCULATED.3IONS-SCNC: 6 MMOL/L (ref 4–13)
AST SERPL W P-5'-P-CCNC: 19 U/L (ref 13–39)
BASOPHILS # BLD AUTO: 0.05 THOUSANDS/ÂΜL (ref 0–0.1)
BASOPHILS NFR BLD AUTO: 1 % (ref 0–1)
BILIRUB SERPL-MCNC: 1.08 MG/DL (ref 0.2–1)
BUN SERPL-MCNC: 27 MG/DL (ref 5–25)
CALCIUM SERPL-MCNC: 10.7 MG/DL (ref 8.4–10.2)
CHLORIDE SERPL-SCNC: 101 MMOL/L (ref 96–108)
CHOLEST SERPL-MCNC: 163 MG/DL (ref ?–200)
CO2 SERPL-SCNC: 27 MMOL/L (ref 21–32)
CREAT SERPL-MCNC: 0.84 MG/DL (ref 0.6–1.3)
EOSINOPHIL # BLD AUTO: 0.18 THOUSAND/ÂΜL (ref 0–0.61)
EOSINOPHIL NFR BLD AUTO: 3 % (ref 0–6)
ERYTHROCYTE [DISTWIDTH] IN BLOOD BY AUTOMATED COUNT: 13.1 % (ref 11.6–15.1)
EST. AVERAGE GLUCOSE BLD GHB EST-MCNC: 134 MG/DL
GFR SERPL CREATININE-BSD FRML MDRD: 71 ML/MIN/1.73SQ M
GLUCOSE P FAST SERPL-MCNC: 104 MG/DL (ref 65–99)
HBA1C MFR BLD: 6.3 %
HCT VFR BLD AUTO: 48 % (ref 34.8–46.1)
HDLC SERPL-MCNC: 53 MG/DL
HGB BLD-MCNC: 15.4 G/DL (ref 11.5–15.4)
IMM GRANULOCYTES # BLD AUTO: 0.03 THOUSAND/UL (ref 0–0.2)
IMM GRANULOCYTES NFR BLD AUTO: 1 % (ref 0–2)
LDLC SERPL CALC-MCNC: 80 MG/DL (ref 0–100)
LYMPHOCYTES # BLD AUTO: 1.93 THOUSANDS/ÂΜL (ref 0.6–4.47)
LYMPHOCYTES NFR BLD AUTO: 29 % (ref 14–44)
MCH RBC QN AUTO: 30.2 PG (ref 26.8–34.3)
MCHC RBC AUTO-ENTMCNC: 32.1 G/DL (ref 31.4–37.4)
MCV RBC AUTO: 94 FL (ref 82–98)
MONOCYTES # BLD AUTO: 0.58 THOUSAND/ÂΜL (ref 0.17–1.22)
MONOCYTES NFR BLD AUTO: 9 % (ref 4–12)
NEUTROPHILS # BLD AUTO: 3.88 THOUSANDS/ÂΜL (ref 1.85–7.62)
NEUTS SEG NFR BLD AUTO: 57 % (ref 43–75)
NRBC BLD AUTO-RTO: 0 /100 WBCS
PLATELET # BLD AUTO: 286 THOUSANDS/UL (ref 149–390)
PMV BLD AUTO: 10.1 FL (ref 8.9–12.7)
POTASSIUM SERPL-SCNC: 5 MMOL/L (ref 3.5–5.3)
PROT SERPL-MCNC: 7.6 G/DL (ref 6.4–8.4)
RBC # BLD AUTO: 5.1 MILLION/UL (ref 3.81–5.12)
SODIUM SERPL-SCNC: 134 MMOL/L (ref 135–147)
T4 FREE SERPL-MCNC: 0.96 NG/DL (ref 0.61–1.12)
TRIGL SERPL-MCNC: 148 MG/DL (ref ?–150)
TSH SERPL DL<=0.05 MIU/L-ACNC: 2.53 UIU/ML (ref 0.45–4.5)
WBC # BLD AUTO: 6.65 THOUSAND/UL (ref 4.31–10.16)

## 2025-03-25 ENCOUNTER — RA CDI HCC (OUTPATIENT)
Dept: OTHER | Facility: HOSPITAL | Age: 70
End: 2025-03-25

## 2025-04-02 ENCOUNTER — OFFICE VISIT (OUTPATIENT)
Dept: FAMILY MEDICINE CLINIC | Facility: CLINIC | Age: 70
End: 2025-04-02
Payer: MEDICARE

## 2025-04-02 VITALS
HEART RATE: 76 BPM | WEIGHT: 166 LBS | BODY MASS INDEX: 28.34 KG/M2 | TEMPERATURE: 97.6 F | SYSTOLIC BLOOD PRESSURE: 128 MMHG | OXYGEN SATURATION: 96 % | DIASTOLIC BLOOD PRESSURE: 78 MMHG | HEIGHT: 64 IN

## 2025-04-02 DIAGNOSIS — I10 ESSENTIAL HYPERTENSION: Primary | ICD-10-CM

## 2025-04-02 DIAGNOSIS — Z82.49 FAMILY HISTORY OF HYPERTROPHIC CARDIOMYOPATHY: ICD-10-CM

## 2025-04-02 DIAGNOSIS — E83.52 HYPERCALCEMIA: ICD-10-CM

## 2025-04-02 DIAGNOSIS — G47.33 OBSTRUCTIVE SLEEP APNEA SYNDROME: ICD-10-CM

## 2025-04-02 DIAGNOSIS — E78.2 MIXED HYPERLIPIDEMIA: ICD-10-CM

## 2025-04-02 DIAGNOSIS — E03.9 ACQUIRED HYPOTHYROIDISM: ICD-10-CM

## 2025-04-02 DIAGNOSIS — R73.03 PREDIABETES: ICD-10-CM

## 2025-04-02 PROCEDURE — 99213 OFFICE O/P EST LOW 20 MIN: CPT | Performed by: INTERNAL MEDICINE

## 2025-04-02 PROCEDURE — G2211 COMPLEX E/M VISIT ADD ON: HCPCS | Performed by: INTERNAL MEDICINE

## 2025-04-02 NOTE — ASSESSMENT & PLAN NOTE
Continue on metformin.  Continue to monitor hemoglobin A1c.  Orders:  •  CBC and differential; Future  •  Hemoglobin A1C; Future

## 2025-04-02 NOTE — ASSESSMENT & PLAN NOTE
Continue current medical therapy.  Blood pressure appears well-controlled.  Orders:  •  CBC and differential; Future  •  Comprehensive metabolic panel; Future

## 2025-04-02 NOTE — PROGRESS NOTES
Name: Vicky L Kocher      : 1955      MRN: [id number]  Encounter Provider: Chuy Arora DO  Encounter Date: 2025   Encounter department: [facility]  :  Assessment & Plan  Essential hypertension  Continue current medical therapy.  Blood pressure appears well-controlled.  Orders:  •  CBC and differential; Future  •  Comprehensive metabolic panel; Future    Obstructive sleep apnea syndrome  Continues to be compliant with her CPAP.       Acquired hypothyroidism  Continues on replacement periodic monitoring.  Orders:  •  T4, free; Future  •  TSH, 3rd generation; Future    Prediabetes  Continue on metformin.  Continue to monitor hemoglobin A1c.  Orders:  •  CBC and differential; Future  •  Hemoglobin A1C; Future    Family history of hypertrophic cardiomyopathy  Consider echocardiogram every 2 years.       Mixed hyperlipidemia  Continues on rosuvastatin daily, with excellent control of lipids.  Orders:  •  Lipid Panel with Direct LDL reflex; Future    Hypercalcemia  Mild fluctuating hypercalcemia.  Suspect primary hyperparathyroidism.  No history of kidney stones.  Will check intact PTH with her next labs in 6 months.  Orders:  •  PTH, intact; Future          Depression Screening and Follow-up Plan: Patient was screened for depression during today's encounter. They screened negative with a PHQ-2 score of 0.        History of Present Illness   Feels well.  Offers no complaints but some fullness in her left ear.  Feels like there is water behind it.  She has no fever no chills or congestion.  Bowels and bladder been normal.  Has occasional struggle with constipation, takes occasional stool softener.  Will consider using MiraLAX.  She has had no recent weight gain or weight loss, she is comfortable at her current weight.  She has had labs in the past just dipping into the diabetic range but now is prediabetic on metformin.  No recent falls.  No urinary issues.  Denies chest pain or  "shortness of breath.  Family history of hypertrophic obstructive cardiomyopathy in her sister.  She plans on doing the \"helix\" lab work through [facility].  Reviewed labs today.  Medications reviewed.      Review of Systems   Constitutional:  Negative for chills and fever.   HENT:  Negative for congestion and sore throat.         Left ear fullness   Eyes:  Negative for visual disturbance.   Respiratory:  Negative for cough and shortness of breath.    Cardiovascular:  Negative for chest pain and palpitations.   Gastrointestinal:  Negative for abdominal pain and vomiting.   Genitourinary:  Negative for hematuria.   Musculoskeletal:  Positive for arthralgias. Negative for back pain.   Skin:  Negative for color change and rash.   Neurological:  Negative for dizziness and syncope.   All other systems reviewed and are negative.      Objective   /78 (BP Location: Left arm, Patient Position: Sitting, Cuff Size: Adult)   Pulse 76   Temp 97.6 °F (36.4 °C) (Tympanic)   Ht 5' 4\" (1.626 m)   Wt 75.3 kg (166 lb)   SpO2 96%   BMI 28.49 kg/m²      Physical Exam  Vitals and nursing note reviewed.   Constitutional:       General: She is not in acute distress.     Appearance: Normal appearance. She is well-developed.   HENT:      Head: Normocephalic and atraumatic.   Eyes:      Conjunctiva/sclera: Conjunctivae normal.   Cardiovascular:      Rate and Rhythm: Normal rate and regular rhythm.      Pulses: Normal pulses.      Heart sounds: Normal heart sounds. No murmur heard.  Pulmonary:      Effort: Pulmonary effort is normal. No respiratory distress.      Breath sounds: Normal breath sounds.   Abdominal:      Palpations: Abdomen is soft.      Tenderness: There is no abdominal tenderness.   Musculoskeletal:         General: No swelling.      Cervical back: Neck supple.   Skin:     General: Skin is warm and dry.      Capillary Refill: Capillary refill takes less than 2 seconds.   Neurological:      General: No focal deficit " present.      Mental Status: She is alert and oriented to person, place, and time.   Psychiatric:         Mood and Affect: Mood normal.

## 2025-04-02 NOTE — ASSESSMENT & PLAN NOTE
Continues on replacement periodic monitoring.  Orders:  •  T4, free; Future  •  TSH, 3rd generation; Future

## 2025-04-02 NOTE — ASSESSMENT & PLAN NOTE
Continues on rosuvastatin daily, with excellent control of lipids.  Orders:  •  Lipid Panel with Direct LDL reflex; Future

## 2025-06-09 DIAGNOSIS — E03.9 ACQUIRED HYPOTHYROIDISM: ICD-10-CM

## 2025-06-09 DIAGNOSIS — R73.03 PREDIABETES: ICD-10-CM

## 2025-06-09 DIAGNOSIS — I10 ESSENTIAL HYPERTENSION: ICD-10-CM

## 2025-06-09 RX ORDER — LEVOTHYROXINE SODIUM 50 UG/1
50 TABLET ORAL DAILY
Qty: 90 TABLET | Refills: 1 | Status: SHIPPED | OUTPATIENT
Start: 2025-06-09

## 2025-06-09 RX ORDER — LISINOPRIL AND HYDROCHLOROTHIAZIDE 12.5; 2 MG/1; MG/1
1 TABLET ORAL DAILY
Qty: 90 TABLET | Refills: 1 | Status: SHIPPED | OUTPATIENT
Start: 2025-06-09

## 2025-06-09 RX ORDER — SPIRONOLACTONE 25 MG/1
25 TABLET ORAL DAILY
Qty: 90 TABLET | Refills: 1 | Status: SHIPPED | OUTPATIENT
Start: 2025-06-09

## 2025-08-07 ENCOUNTER — CLINICAL SUPPORT (OUTPATIENT)
Dept: FAMILY MEDICINE CLINIC | Facility: CLINIC | Age: 70
End: 2025-08-07
Payer: MEDICARE

## 2025-08-07 DIAGNOSIS — R35.0 FREQUENCY OF MICTURITION: ICD-10-CM

## 2025-08-07 DIAGNOSIS — T75.3XXS MOTION SICKNESS, SEQUELA: Primary | ICD-10-CM

## 2025-08-07 RX ORDER — SCOPOLAMINE 1 MG/3D
1 PATCH, EXTENDED RELEASE TRANSDERMAL
Qty: 5 PATCH | Refills: 2 | Status: SHIPPED | OUTPATIENT
Start: 2025-08-07